# Patient Record
Sex: MALE | Employment: UNEMPLOYED | URBAN - METROPOLITAN AREA
[De-identification: names, ages, dates, MRNs, and addresses within clinical notes are randomized per-mention and may not be internally consistent; named-entity substitution may affect disease eponyms.]

---

## 2018-09-19 ENCOUNTER — HOSPITAL ENCOUNTER (EMERGENCY)
Age: 68
Discharge: ANOTHER HOSPITAL | End: 2018-09-19
Attending: EMERGENCY MEDICINE
Payer: MEDICARE

## 2018-09-19 ENCOUNTER — HOSPITAL ENCOUNTER (INPATIENT)
Age: 68
LOS: 6 days | Discharge: NURSING, REHAB OR LONG-TERM CARE | DRG: 179 | End: 2018-09-25
Attending: INTERNAL MEDICINE | Admitting: INTERNAL MEDICINE
Payer: MEDICARE

## 2018-09-19 ENCOUNTER — APPOINTMENT (OUTPATIENT)
Dept: CT IMAGING | Age: 68
End: 2018-09-19
Payer: MEDICARE

## 2018-09-19 ENCOUNTER — APPOINTMENT (OUTPATIENT)
Dept: GENERAL RADIOLOGY | Age: 68
End: 2018-09-19
Payer: MEDICARE

## 2018-09-19 VITALS
BODY MASS INDEX: 25.85 KG/M2 | HEART RATE: 83 BPM | WEIGHT: 170 LBS | TEMPERATURE: 98.5 F | RESPIRATION RATE: 17 BRPM | SYSTOLIC BLOOD PRESSURE: 116 MMHG | DIASTOLIC BLOOD PRESSURE: 62 MMHG | OXYGEN SATURATION: 93 %

## 2018-09-19 DIAGNOSIS — J18.9 PNEUMONIA DUE TO ORGANISM: Primary | ICD-10-CM

## 2018-09-19 DIAGNOSIS — D72.829 LEUKOCYTOSIS, UNSPECIFIED TYPE: ICD-10-CM

## 2018-09-19 DIAGNOSIS — R11.11 VOMITING WITHOUT NAUSEA, INTRACTABILITY OF VOMITING NOT SPECIFIED, UNSPECIFIED VOMITING TYPE: ICD-10-CM

## 2018-09-19 PROBLEM — R10.9 ABDOMINAL PAIN: Status: ACTIVE | Noted: 2018-09-19

## 2018-09-19 LAB
-: NORMAL
ABSOLUTE EOS #: 0 K/UL (ref 0–0.44)
ABSOLUTE IMMATURE GRANULOCYTE: 0.67 K/UL (ref 0–0.3)
ABSOLUTE LYMPH #: 1.69 K/UL (ref 1.1–3.7)
ABSOLUTE MONO #: 2.36 K/UL (ref 0.1–1.2)
ALBUMIN SERPL-MCNC: 3.2 G/DL (ref 3.5–5.2)
ALBUMIN/GLOBULIN RATIO: 0.8 (ref 1–2.5)
ALP BLD-CCNC: 233 U/L (ref 40–129)
ALT SERPL-CCNC: 66 U/L (ref 5–41)
ANION GAP SERPL CALCULATED.3IONS-SCNC: 17 MMOL/L (ref 9–17)
AST SERPL-CCNC: 62 U/L
BASOPHILS # BLD: 0 % (ref 0–2)
BASOPHILS ABSOLUTE: 0 K/UL (ref 0–0.2)
BILIRUB SERPL-MCNC: 0.94 MG/DL (ref 0.3–1.2)
BUN BLDV-MCNC: 9 MG/DL (ref 8–23)
BUN/CREAT BLD: ABNORMAL (ref 9–20)
CALCIUM SERPL-MCNC: 8.6 MG/DL (ref 8.6–10.4)
CHLORIDE BLD-SCNC: 90 MMOL/L (ref 98–107)
CO2: 24 MMOL/L (ref 20–31)
CREAT SERPL-MCNC: 0.6 MG/DL (ref 0.7–1.2)
D-DIMER QUANTITATIVE: 0.86 MG/L FEU
DIFFERENTIAL TYPE: ABNORMAL
EKG ATRIAL RATE: 103 BPM
EKG P AXIS: 67 DEGREES
EKG P-R INTERVAL: 146 MS
EKG Q-T INTERVAL: 336 MS
EKG QRS DURATION: 68 MS
EKG QTC CALCULATION (BAZETT): 440 MS
EKG R AXIS: 79 DEGREES
EKG T AXIS: 70 DEGREES
EKG VENTRICULAR RATE: 103 BPM
EOSINOPHILS RELATIVE PERCENT: 0 % (ref 1–4)
GFR AFRICAN AMERICAN: >60 ML/MIN
GFR NON-AFRICAN AMERICAN: >60 ML/MIN
GFR SERPL CREATININE-BSD FRML MDRD: ABNORMAL ML/MIN/{1.73_M2}
GFR SERPL CREATININE-BSD FRML MDRD: ABNORMAL ML/MIN/{1.73_M2}
GLUCOSE BLD-MCNC: 138 MG/DL (ref 70–99)
HCT VFR BLD CALC: 46.2 % (ref 40.7–50.3)
HEMOGLOBIN: 15.5 G/DL (ref 13–17)
IMMATURE GRANULOCYTES: 2 %
INR BLD: 1.1
LACTIC ACID, SEPSIS WHOLE BLOOD: 1.3 MMOL/L (ref 0.5–1.9)
LACTIC ACID, SEPSIS: NORMAL MMOL/L (ref 0.5–1.9)
LACTIC ACID, WHOLE BLOOD: 1.4 MMOL/L (ref 0.7–2.1)
LACTIC ACID, WHOLE BLOOD: 2.2 MMOL/L (ref 0.7–2.1)
LIPASE: 17 U/L (ref 13–60)
LYMPHOCYTES # BLD: 5 % (ref 24–43)
MAGNESIUM: 2.1 MG/DL (ref 1.6–2.6)
MCH RBC QN AUTO: 28.4 PG (ref 25.2–33.5)
MCHC RBC AUTO-ENTMCNC: 33.5 G/DL (ref 28.4–34.8)
MCV RBC AUTO: 84.8 FL (ref 82.6–102.9)
MONOCYTES # BLD: 7 % (ref 3–12)
MORPHOLOGY: ABNORMAL
NRBC AUTOMATED: 0 PER 100 WBC
PARTIAL THROMBOPLASTIN TIME: 26.6 SEC (ref 20.5–30.5)
PDW BLD-RTO: 13.7 % (ref 11.8–14.4)
PLATELET # BLD: 337 K/UL (ref 138–453)
PLATELET ESTIMATE: ABNORMAL
PMV BLD AUTO: 10.9 FL (ref 8.1–13.5)
POC TROPONIN I: 0.06 NG/ML (ref 0–0.1)
POC TROPONIN INTERP: NORMAL
POTASSIUM SERPL-SCNC: 3.6 MMOL/L (ref 3.7–5.3)
PROTHROMBIN TIME: 11.7 SEC (ref 9–12)
RBC # BLD: 5.45 M/UL (ref 4.21–5.77)
RBC # BLD: ABNORMAL 10*6/UL
REASON FOR REJECTION: NORMAL
SEG NEUTROPHILS: 86 % (ref 36–65)
SEGMENTED NEUTROPHILS ABSOLUTE COUNT: 28.98 K/UL (ref 1.5–8.1)
SODIUM BLD-SCNC: 131 MMOL/L (ref 135–144)
TOTAL PROTEIN: 7.4 G/DL (ref 6.4–8.3)
WBC # BLD: 33.7 K/UL (ref 3.5–11.3)
WBC # BLD: ABNORMAL 10*3/UL
ZZ NTE CLEAN UP: ORDERED TEST: NORMAL
ZZ NTE WITH NAME CLEAN UP: SPECIMEN SOURCE: NORMAL

## 2018-09-19 PROCEDURE — 6370000000 HC RX 637 (ALT 250 FOR IP): Performed by: STUDENT IN AN ORGANIZED HEALTH CARE EDUCATION/TRAINING PROGRAM

## 2018-09-19 PROCEDURE — 2580000003 HC RX 258: Performed by: STUDENT IN AN ORGANIZED HEALTH CARE EDUCATION/TRAINING PROGRAM

## 2018-09-19 PROCEDURE — 6360000004 HC RX CONTRAST MEDICATION: Performed by: EMERGENCY MEDICINE

## 2018-09-19 PROCEDURE — 87040 BLOOD CULTURE FOR BACTERIA: CPT

## 2018-09-19 PROCEDURE — 71046 X-RAY EXAM CHEST 2 VIEWS: CPT

## 2018-09-19 PROCEDURE — 83605 ASSAY OF LACTIC ACID: CPT

## 2018-09-19 PROCEDURE — 2060000000 HC ICU INTERMEDIATE R&B

## 2018-09-19 PROCEDURE — 99223 1ST HOSP IP/OBS HIGH 75: CPT | Performed by: INTERNAL MEDICINE

## 2018-09-19 PROCEDURE — 83735 ASSAY OF MAGNESIUM: CPT

## 2018-09-19 PROCEDURE — 6360000002 HC RX W HCPCS: Performed by: STUDENT IN AN ORGANIZED HEALTH CARE EDUCATION/TRAINING PROGRAM

## 2018-09-19 PROCEDURE — 99285 EMERGENCY DEPT VISIT HI MDM: CPT

## 2018-09-19 PROCEDURE — 6360000002 HC RX W HCPCS: Performed by: NURSE PRACTITIONER

## 2018-09-19 PROCEDURE — 83690 ASSAY OF LIPASE: CPT

## 2018-09-19 PROCEDURE — 94640 AIRWAY INHALATION TREATMENT: CPT

## 2018-09-19 PROCEDURE — 6370000000 HC RX 637 (ALT 250 FOR IP): Performed by: INTERNAL MEDICINE

## 2018-09-19 PROCEDURE — 6360000002 HC RX W HCPCS: Performed by: EMERGENCY MEDICINE

## 2018-09-19 PROCEDURE — 71260 CT THORAX DX C+: CPT

## 2018-09-19 PROCEDURE — 93005 ELECTROCARDIOGRAM TRACING: CPT

## 2018-09-19 PROCEDURE — 2580000003 HC RX 258: Performed by: NURSE PRACTITIONER

## 2018-09-19 PROCEDURE — 96375 TX/PRO/DX INJ NEW DRUG ADDON: CPT

## 2018-09-19 PROCEDURE — 85730 THROMBOPLASTIN TIME PARTIAL: CPT

## 2018-09-19 PROCEDURE — 96365 THER/PROPH/DIAG IV INF INIT: CPT

## 2018-09-19 PROCEDURE — 85379 FIBRIN DEGRADATION QUANT: CPT

## 2018-09-19 PROCEDURE — 85025 COMPLETE CBC W/AUTO DIFF WBC: CPT

## 2018-09-19 PROCEDURE — 80053 COMPREHEN METABOLIC PANEL: CPT

## 2018-09-19 PROCEDURE — 96367 TX/PROPH/DG ADDL SEQ IV INF: CPT

## 2018-09-19 PROCEDURE — 84484 ASSAY OF TROPONIN QUANT: CPT

## 2018-09-19 PROCEDURE — 85610 PROTHROMBIN TIME: CPT

## 2018-09-19 RX ORDER — PANTOPRAZOLE SODIUM 40 MG/1
40 TABLET, DELAYED RELEASE ORAL
Status: DISCONTINUED | OUTPATIENT
Start: 2018-09-20 | End: 2018-09-24 | Stop reason: SDUPTHER

## 2018-09-19 RX ORDER — 0.9 % SODIUM CHLORIDE 0.9 %
1000 INTRAVENOUS SOLUTION INTRAVENOUS ONCE
Status: DISCONTINUED | OUTPATIENT
Start: 2018-09-19 | End: 2018-09-19

## 2018-09-19 RX ORDER — ONDANSETRON 2 MG/ML
4 INJECTION INTRAMUSCULAR; INTRAVENOUS EVERY 6 HOURS PRN
Status: DISCONTINUED | OUTPATIENT
Start: 2018-09-19 | End: 2018-09-25 | Stop reason: HOSPADM

## 2018-09-19 RX ORDER — PROMETHAZINE HYDROCHLORIDE 25 MG/ML
12.5 INJECTION, SOLUTION INTRAMUSCULAR; INTRAVENOUS EVERY 4 HOURS PRN
Status: DISCONTINUED | OUTPATIENT
Start: 2018-09-19 | End: 2018-09-25 | Stop reason: HOSPADM

## 2018-09-19 RX ORDER — IPRATROPIUM BROMIDE AND ALBUTEROL SULFATE 2.5; .5 MG/3ML; MG/3ML
1 SOLUTION RESPIRATORY (INHALATION) ONCE
Status: COMPLETED | OUTPATIENT
Start: 2018-09-19 | End: 2018-09-19

## 2018-09-19 RX ORDER — IPRATROPIUM BROMIDE AND ALBUTEROL SULFATE 2.5; .5 MG/3ML; MG/3ML
1 SOLUTION RESPIRATORY (INHALATION) EVERY 6 HOURS PRN
Status: DISCONTINUED | OUTPATIENT
Start: 2018-09-19 | End: 2018-09-25 | Stop reason: HOSPADM

## 2018-09-19 RX ORDER — TAMSULOSIN HYDROCHLORIDE 0.4 MG/1
0.4 CAPSULE ORAL DAILY
Status: DISCONTINUED | OUTPATIENT
Start: 2018-09-19 | End: 2018-09-25 | Stop reason: HOSPADM

## 2018-09-19 RX ORDER — ONDANSETRON 4 MG/1
4 TABLET, ORALLY DISINTEGRATING ORAL ONCE
Status: COMPLETED | OUTPATIENT
Start: 2018-09-19 | End: 2018-09-19

## 2018-09-19 RX ORDER — BISACODYL 10 MG
10 SUPPOSITORY, RECTAL RECTAL DAILY PRN
Status: DISCONTINUED | OUTPATIENT
Start: 2018-09-19 | End: 2018-09-23 | Stop reason: SDUPTHER

## 2018-09-19 RX ORDER — DOCUSATE SODIUM 100 MG/1
100 CAPSULE, LIQUID FILLED ORAL 2 TIMES DAILY
Status: DISCONTINUED | OUTPATIENT
Start: 2018-09-19 | End: 2018-09-19

## 2018-09-19 RX ORDER — SODIUM CHLORIDE 0.9 % (FLUSH) 0.9 %
10 SYRINGE (ML) INJECTION EVERY 12 HOURS SCHEDULED
Status: DISCONTINUED | OUTPATIENT
Start: 2018-09-19 | End: 2018-09-25 | Stop reason: HOSPADM

## 2018-09-19 RX ORDER — FERROUS SULFATE 325(65) MG
325 TABLET ORAL 2 TIMES DAILY WITH MEALS
Status: DISCONTINUED | OUTPATIENT
Start: 2018-09-19 | End: 2018-09-25 | Stop reason: HOSPADM

## 2018-09-19 RX ORDER — SODIUM CHLORIDE 0.9 % (FLUSH) 0.9 %
10 SYRINGE (ML) INJECTION PRN
Status: DISCONTINUED | OUTPATIENT
Start: 2018-09-19 | End: 2018-09-25 | Stop reason: HOSPADM

## 2018-09-19 RX ORDER — SODIUM CHLORIDE 9 MG/ML
INJECTION, SOLUTION INTRAVENOUS CONTINUOUS
Status: DISCONTINUED | OUTPATIENT
Start: 2018-09-19 | End: 2018-09-25 | Stop reason: HOSPADM

## 2018-09-19 RX ORDER — LANOLIN ALCOHOL/MO/W.PET/CERES
325 CREAM (GRAM) TOPICAL 2 TIMES DAILY
Status: DISCONTINUED | OUTPATIENT
Start: 2018-09-19 | End: 2018-09-19 | Stop reason: CLARIF

## 2018-09-19 RX ORDER — NICOTINE 21 MG/24HR
1 PATCH, TRANSDERMAL 24 HOURS TRANSDERMAL DAILY PRN
Status: DISCONTINUED | OUTPATIENT
Start: 2018-09-19 | End: 2018-09-25 | Stop reason: HOSPADM

## 2018-09-19 RX ORDER — ALBUTEROL SULFATE 90 UG/1
2 AEROSOL, METERED RESPIRATORY (INHALATION) EVERY 6 HOURS PRN
Status: DISCONTINUED | OUTPATIENT
Start: 2018-09-19 | End: 2018-09-25 | Stop reason: HOSPADM

## 2018-09-19 RX ORDER — METHYLPREDNISOLONE SODIUM SUCCINATE 125 MG/2ML
125 INJECTION, POWDER, LYOPHILIZED, FOR SOLUTION INTRAMUSCULAR; INTRAVENOUS ONCE
Status: COMPLETED | OUTPATIENT
Start: 2018-09-19 | End: 2018-09-19

## 2018-09-19 RX ORDER — ACETAMINOPHEN 325 MG/1
650 TABLET ORAL EVERY 4 HOURS PRN
Status: DISCONTINUED | OUTPATIENT
Start: 2018-09-19 | End: 2018-09-25 | Stop reason: HOSPADM

## 2018-09-19 RX ORDER — 0.9 % SODIUM CHLORIDE 0.9 %
30 INTRAVENOUS SOLUTION INTRAVENOUS ONCE
Status: COMPLETED | OUTPATIENT
Start: 2018-09-19 | End: 2018-09-19

## 2018-09-19 RX ADMIN — METHYLPREDNISOLONE SODIUM SUCCINATE 125 MG: 125 INJECTION, POWDER, FOR SOLUTION INTRAMUSCULAR; INTRAVENOUS at 06:47

## 2018-09-19 RX ADMIN — PIPERACILLIN AND TAZOBACTAM 3.38 G: 3; .375 INJECTION, POWDER, LYOPHILIZED, FOR SOLUTION INTRAVENOUS; PARENTERAL at 08:42

## 2018-09-19 RX ADMIN — ONDANSETRON 4 MG: 4 TABLET, ORALLY DISINTEGRATING ORAL at 06:47

## 2018-09-19 RX ADMIN — TAMSULOSIN HYDROCHLORIDE 0.4 MG: 0.4 CAPSULE ORAL at 21:06

## 2018-09-19 RX ADMIN — IOPAMIDOL 75 ML: 755 INJECTION, SOLUTION INTRAVENOUS at 08:32

## 2018-09-19 RX ADMIN — SODIUM CHLORIDE: 9 INJECTION, SOLUTION INTRAVENOUS at 23:29

## 2018-09-19 RX ADMIN — SODIUM CHLORIDE 2313 ML: 9 INJECTION, SOLUTION INTRAVENOUS at 08:43

## 2018-09-19 RX ADMIN — Medication 10 ML: at 21:07

## 2018-09-19 RX ADMIN — PIPERACILLIN SODIUM AND TAZOBACTAM SODIUM 3.38 G: 3; .375 INJECTION, POWDER, LYOPHILIZED, FOR SOLUTION INTRAVENOUS at 23:29

## 2018-09-19 RX ADMIN — IPRATROPIUM BROMIDE AND ALBUTEROL SULFATE 1 AMPULE: .5; 3 SOLUTION RESPIRATORY (INHALATION) at 06:17

## 2018-09-19 RX ADMIN — ENOXAPARIN SODIUM 40 MG: 40 INJECTION SUBCUTANEOUS at 21:06

## 2018-09-19 RX ADMIN — DOCUSATE SODIUM 100 MG: 100 CAPSULE, LIQUID FILLED ORAL at 21:05

## 2018-09-19 RX ADMIN — Medication 1250 MG: at 10:00

## 2018-09-19 RX ADMIN — ONDANSETRON 4 MG: 2 INJECTION INTRAMUSCULAR; INTRAVENOUS at 21:05

## 2018-09-19 RX ADMIN — FERROUS SULFATE TAB 325 MG (65 MG ELEMENTAL FE) 325 MG: 325 (65 FE) TAB at 21:05

## 2018-09-19 ASSESSMENT — PAIN SCALES - GENERAL
PAINLEVEL_OUTOF10: 0
PAINLEVEL_OUTOF10: 4

## 2018-09-19 ASSESSMENT — PAIN DESCRIPTION - ONSET: ONSET: SUDDEN

## 2018-09-19 ASSESSMENT — PAIN DESCRIPTION - DESCRIPTORS: DESCRIPTORS: ACHING

## 2018-09-19 ASSESSMENT — PAIN DESCRIPTION - FREQUENCY: FREQUENCY: CONTINUOUS

## 2018-09-19 ASSESSMENT — PAIN SCALES - WONG BAKER: WONGBAKER_NUMERICALRESPONSE: 0

## 2018-09-19 ASSESSMENT — PAIN DESCRIPTION - ORIENTATION: ORIENTATION: LEFT;LOWER;RIGHT

## 2018-09-19 ASSESSMENT — PAIN DESCRIPTION - LOCATION: LOCATION: ABDOMEN

## 2018-09-19 NOTE — ED PROVIDER NOTES
[facility] ED  Emergency Department Encounter  Emergency Medicine Resident     Pt Name: Ally Bridges  MRN: [id number]  [place] 1950  Date of evaluation: 18  PCP:  Denise Rangel MD    [address] [place] [address]       Chief Complaint   Patient presents with    Emesis       HISTORY OF PRESENT ILLNESS  (Location/Symptom, Timing/Onset, Context/Setting, Quality, Duration, Modifying Factors, Severity.)      Ally Bridges is a 79 y.o. male who presents with Abdominal pain, nausea, vomiting. Patient states that last week he ate some yang bits that may have gone bad, . After that, he started developing nausea, vomiting, diarrhea. States that he has also been having productive cough, yellow sputum. Intermittent headache. Denies chest pain, sugars of breath, fever, dysuria, hematuria, blood in his stools, Leg swelling, leg tenderness, prolonged immobilization. PAST MEDICAL / SURGICAL / SOCIAL / FAMILY HISTORY      has a past medical history of Acid reflux; Anemia; Colon polyps; COPD (chronic obstructive pulmonary disease) ([facility] [address] and Rectal prolapse.       has a past surgical history that includes Tonsillectomy; eye surgery; Colonoscopy (2015); and Colonoscopy (2016). Social History     Social History    Marital status: Single     Spouse name: N/A    Number of children: N/A    Years of education: N/A     Occupational History    Not on file. Social History Main Topics    Smoking status: Current Every Day Smoker     Packs/day: 1.50    Smokeless tobacco: Not on file    Alcohol use No    Drug use: Yes     Types: Marijuana    Sexual activity: Not on file     Other Topics Concern    Not on file     Social History Narrative    No narrative on file       Family History   Problem Relation Age of Onset    Diabetes Brother          Allergies:  Patient has no known allergies.     Home Medications:  Prior to Admission medications    Medication Sig Start Date End Date Taking? Authorizing Provider   omeprazole (PRILOSEC) 20 MG capsule Take 20 mg by mouth 2 times daily    Historical Provider, MD   albuterol-ipratropium (COMBIVENT RESPIMAT)  MCG/ACT AERS inhaler Inhale 1 puff into the lungs every 6 hours as needed for Wheezing    Historical Provider, MD   albuterol sulfate  (90 BASE) MCG/ACT inhaler Inhale 2 puffs into the lungs every 6 hours as needed for Wheezing    Historical Provider, MD   hydrocortisone (ANUSOL-HC) 2.5 % rectal cream Place rectally 3 times daily as needed for Hemorrhoids Place rectally 2 times daily. Historical Provider, MD   docusate sodium (COLACE) 100 MG capsule Take 1 capsule by mouth 2 times daily 8/21/15   Naida Salvage,    acetaminophen 650 MG TABS Take 650 mg by mouth every 4 hours as needed 8/15/15   Lita Chandler MD   tamsulosin [facility]) 0.4 MG capsule Take 1 capsule by mouth daily 8/15/15   Lita Chandler MD   ferrous sulfate (FE TABS) 325 (65 FE) MG EC tablet Take 1 tablet by mouth 2 times daily 8/15/15   Lita Chandler MD       REVIEW OF SYSTEMS    (2-9 systems for level 4, 10 or more for level 5)      Review of Systems   Constitutional: Positive for chills. Negative for activity change, fatigue and fever. HENT: Negative for congestion, ear pain, rhinorrhea and sinus pain. Eyes: Negative for pain and redness. Respiratory: Positive for cough. Negative for apnea, shortness of breath and wheezing. Cardiovascular: Negative for chest pain. Gastrointestinal: Positive for abdominal pain, diarrhea, nausea and vomiting. Negative for blood in stool. Genitourinary: Negative for decreased urine volume, difficulty urinating and dysuria. Musculoskeletal: Negative for neck pain and neck stiffness. Skin: Negative for rash. Neurological: Positive for headaches. Negative for dizziness, syncope and weakness.        PHYSICAL EXAM   (up to 7 for level 4, 8 or more for level 5)      INITIAL VITALS:   /75   Pulse 115 Value Ref Range    POC Troponin I 0.06 0.00 - 0.10 ng/mL    POC Troponin Interp       The Troponin-I (POC) results cannot be compared to the Troponin-T results. EKG 12 Lead   Result Value Ref Range    Ventricular Rate 103 BPM    Atrial Rate 103 BPM    P-R Interval 146 ms    QRS Duration 68 ms    Q-T Interval 336 ms    QTc Calculation (Bazett) 440 ms    P Axis 67 degrees    R Axis 79 degrees    T Axis 70 degrees       IMPRESSION:  82-year-old male with COPD who presents to the wrist with three-day history of cough, one-week history of nausea, vomiting, diarrhea. Cardiac RRR, no murmurs, rubs, gallops, Lungs are CTA-B, no wheezes, rales, rhonchi, Abd soft, mildly diffusely tender, nondistended. Looks ill, nontoxic appearing. Plan is for basic labs, d-dimer, troponin, EKG. RADIOLOGY:  CT CHEST PULMONARY EMBOLISM W CONTRAST   Final Result   Motion artifact degrades this examination, however there is no evidence for   acute pulmonary embolism. Bilateral airspace disease, suggestive of an inflammatory process or   infection. Mild central airway congestion is also noted. Given the   background of emphysema, short-term noncontrast CT follow-up in 3 months is   recommended to ensure resolution. Mildly prominent mediastinal and hilar lymph nodes. These may be reactive   given the airspace disease. Attention to on follow-up is recommended. Mild relatively diffuse esophageal wall thickening. Question esophagitis. Moderate size hiatal hernia. Emphysema. XR CHEST STANDARD (2 VW)   Final Result   1. Similar prominence of the interstitial/pulmonary vascular markings can be   seen in setting of chronic interstitial disease, mild vascular congestion   and/or atypical pneumonitis. No focal airspace consolidation. 2. Emphysematous changes also identified.              EKG  Sinus tachycardia 103, normal axis, no ST segment or T-wave changes     All EKG's are interpreted by the Emergency

## 2018-09-19 NOTE — ED PROVIDER NOTES
Care assumed from Dr. Chase Notch    Elevated d dimer and white count. Pending CT chest for PE. Pt has pneumonia. Pt has elevated white count and CT shows inflammatory changes. Pt is satting 93-95 on O2. Pt coughing up purulent sputum. Pt amenable to transfer to [facility] since we are currently boarding.       Landon Del Cid MD  09/19/18 2026

## 2018-09-19 NOTE — ED PROVIDER NOTES
[address] ED  Emergency Department  Emergency Medicine Resident Sign-out     Care of Ibeth Lamb was assumed from Dr. Cheyenne Chand and is being seen for Emesis  . The patient's initial evaluation and plan have been discussed with the prior provider who initially evaluated the patient.      EMERGENCY DEPARTMENT COURSE / MEDICAL DECISION MAKING:       MEDICATIONS GIVEN:  Orders Placed This Encounter   Medications    ipratropium-albuterol (DUONEB) nebulizer solution 1 ampule    ondansetron (ZOFRAN-ODT) disintegrating tablet 4 mg    methylPREDNISolone sodium (SOLU-MEDROL) injection 125 mg    DISCONTD: 0.9 % sodium chloride bolus    0.9 % sodium chloride bolus    vancomycin (VANCOCIN) 1250 mg in dextrose 5 % 250 mL IVPB    piperacillin-tazobactam (ZOSYN) 3.375 g in dextrose 5 % 50 mL IVPB (mini-bag)    iopamidol (ISOVUE-370) 76 % injection 75 mL       LABS / RADIOLOGY:     Labs Reviewed   CBC WITH AUTO DIFFERENTIAL - Abnormal; Notable for the following:        Result Value    WBC 33.7 (*)     Immature Granulocytes 2 (*)     Seg Neutrophils 86 (*)     Lymphocytes 5 (*)     Eosinophils % 0 (*)     Absolute Immature Granulocyte 0.67 (*)     Segs Absolute 28.98 (*)     Absolute Mono # 2.36 (*)     All other components within normal limits   COMPREHENSIVE METABOLIC PANEL - Abnormal; Notable for the following:     Glucose 138 (*)     CREATININE 0.60 (*)     Sodium 131 (*)     Potassium 3.6 (*)     Chloride 90 (*)     Alkaline Phosphatase 233 (*)     ALT 66 (*)     AST 62 (*)     Alb 3.2 (*)     Albumin/Globulin Ratio 0.8 (*)     All other components within normal limits   LACTIC ACID, WHOLE BLOOD - Abnormal; Notable for the following:     Lactic Acid, Whole Blood 2.2 (*)     All other components within normal limits   CULTURE BLOOD #1   CULTURE BLOOD #1   URINE CULTURE   D-DIMER, QUANTITATIVE   LACTIC ACID, WHOLE BLOOD   PROTIME-INR   APTT   MAGNESIUM   LACTATE, SEPSIS   LIPASE   SPECIMEN REJECTION URINALYSIS WITH MICROSCOPIC   POCT TROPONIN   POCT TROPONIN   POCT TROPONIN       Xr Chest Standard (2 Vw)    Result Date: 9/19/2018  EXAMINATION: TWO VIEWS OF THE CHEST 9/19/2018 6:37 am COMPARISON: Chest x-ray from 08/16/2015. HISTORY: ORDERING SYSTEM PROVIDED HISTORY: Cough, SOB TECHNOLOGIST PROVIDED HISTORY: Cough, SOB FINDINGS: Lungs are hyperexpanded with flattening of the hemidiaphragms. There is no focal airspace consolidation, pleural effusion or pneumothorax. The cardiomediastinal silhouette appears within normal limits. There is similar prominence of the pulmonary vascular/interstitial markings. Visualized osseous structures appear intact, and grossly unremarkable, given the non dedicated imaging. 1. Similar prominence of the interstitial/pulmonary vascular markings can be seen in setting of chronic interstitial disease, mild vascular congestion and/or atypical pneumonitis. No focal airspace consolidation. 2. Emphysematous changes also identified. Ct Chest Pulmonary Embolism W Contrast    Result Date: 9/19/2018  EXAMINATION: CTA OF THE CHEST 9/19/2018 8:40 am TECHNIQUE: CTA of the chest was performed after the administration of intravenous contrast.  Multiplanar reformatted images are provided for review. MIP images are provided for review. Dose modulation, iterative reconstruction, and/or weight based adjustment of the mA/kV was utilized to reduce the radiation dose to as low as reasonably achievable. COMPARISON: None. HISTORY: ORDERING SYSTEM PROVIDED HISTORY: pulmonary embolism FINDINGS: Pulmonary Arteries: Pulmonary arteries are adequately opacified for evaluation. Motion artifact degrades evaluation of the segmental branches in the lower lobes. No evidence of intraluminal filling defect to suggest pulmonary embolism. Main pulmonary artery is normal in caliber.  Mediastinum: Mildly prominent right paraesophageal lymphadenopathy is noted in the superior mediastinum measuring up to 8 mm in short axis on image 104. Right hilar lymphatic tissue is also prominent measuring 12 mm on image 63. Mild nonspecific esophageal wall thickening is noted and appears relatively diffuse. Moderate size hiatal hernia is noted. The heart and pericardium demonstrate no acute abnormality. There is no acute abnormality of the thoracic aorta. Calcified atheromatous plaque and coronary calcifications are noted. Lungs/pleura: Respiratory motion artifact degrades fine parenchymal detail, notably in the lung bases. Moderately severe emphysema is noted. Opacities in the medial right upper lobe and medial lingula are noted as well as a peripheral opacity in the inferior right middle lobe. Findings of subsegmental atelectasis in the lung bases are noted. No effusion. No pneumothorax. The airway appears patent. Mild peribronchial wall thickening is noted. Upper Abdomen: No acute findings. Soft Tissues/Bones: No acute bone or soft tissue abnormality. Motion artifact degrades this examination, however there is no evidence for acute pulmonary embolism. Bilateral airspace disease, suggestive of an inflammatory process or infection. Mild central airway congestion is also noted. Given the background of emphysema, short-term noncontrast CT follow-up in 3 months is recommended to ensure resolution. Mildly prominent mediastinal and hilar lymph nodes. These may be reactive given the airspace disease. Attention to on follow-up is recommended. Mild relatively diffuse esophageal wall thickening. Question esophagitis. Moderate size hiatal hernia. Emphysema. RECENT VITALS:     Temp: 98.5 °F (36.9 °C),  Pulse: 83, Resp: 17, BP: 116/62, SpO2: 93 %    This patient is a 79 y.o. Male with midepigastric abdominal pain with associated nausea and vomiting and mild hypoxia as evidenced by low oxygen saturation low 90s.   Patient worked up for infectious etiology versus PE.  CTA of the chest was obtained that showed no

## 2018-09-19 NOTE — ED PROVIDER NOTES
axis no st elevation,       CRITICAL CARE: There was a high probability of clinically significant/life threatening deterioration in this patient's condition which required my urgent intervention. Total critical care time was  0   minutes. This excludes any time for separately reportable procedures.        [address], DO  09/19/18 6106       [facility], DO  09/19/18 [address], DO  09/19/18 [address], DO  09/20/18 7521

## 2018-09-19 NOTE — ED NOTES
Pt c/o headache. Pt coughing up sputum into emesis bag. Pt updated on CT scan. Pt states his breathing is better. Continue to monitor pt.       Chelsea Reddy RN  09/19/18 1409

## 2018-09-20 ENCOUNTER — APPOINTMENT (OUTPATIENT)
Dept: GENERAL RADIOLOGY | Age: 68
DRG: 179 | End: 2018-09-20
Attending: INTERNAL MEDICINE
Payer: MEDICARE

## 2018-09-20 PROBLEM — J18.9 PNEUMONIA: Status: ACTIVE | Noted: 2018-09-20

## 2018-09-20 LAB
ABSOLUTE EOS #: 0 K/UL (ref 0–0.4)
ABSOLUTE IMMATURE GRANULOCYTE: ABNORMAL K/UL (ref 0–0.3)
ABSOLUTE LYMPH #: 1.82 K/UL (ref 1–4.8)
ABSOLUTE MONO #: 0.91 K/UL (ref 0.1–1.3)
ANION GAP SERPL CALCULATED.3IONS-SCNC: 10 MMOL/L (ref 9–17)
BASOPHILS # BLD: 0 % (ref 0–2)
BASOPHILS ABSOLUTE: 0 K/UL (ref 0–0.2)
BUN BLDV-MCNC: 12 MG/DL (ref 8–23)
BUN/CREAT BLD: ABNORMAL (ref 9–20)
CALCIUM SERPL-MCNC: 8.2 MG/DL (ref 8.6–10.4)
CHLORIDE BLD-SCNC: 99 MMOL/L (ref 98–107)
CO2: 27 MMOL/L (ref 20–31)
CREAT SERPL-MCNC: 0.56 MG/DL (ref 0.7–1.2)
DIFFERENTIAL TYPE: ABNORMAL
EOSINOPHILS RELATIVE PERCENT: 0 % (ref 0–4)
GFR AFRICAN AMERICAN: >60 ML/MIN
GFR NON-AFRICAN AMERICAN: >60 ML/MIN
GFR SERPL CREATININE-BSD FRML MDRD: ABNORMAL ML/MIN/{1.73_M2}
GFR SERPL CREATININE-BSD FRML MDRD: ABNORMAL ML/MIN/{1.73_M2}
GLUCOSE BLD-MCNC: 150 MG/DL (ref 70–99)
HCT VFR BLD CALC: 38.3 % (ref 41–53)
HEMOGLOBIN: 12.7 G/DL (ref 13.5–17.5)
IMMATURE GRANULOCYTES: ABNORMAL %
LYMPHOCYTES # BLD: 8 % (ref 24–44)
MCH RBC QN AUTO: 28.3 PG (ref 26–34)
MCHC RBC AUTO-ENTMCNC: 33.1 G/DL (ref 31–37)
MCV RBC AUTO: 85.6 FL (ref 80–100)
MONOCYTES # BLD: 4 % (ref 1–7)
MORPHOLOGY: NORMAL
MRSA, DNA, NASAL: NORMAL
NRBC AUTOMATED: ABNORMAL PER 100 WBC
PDW BLD-RTO: 14.1 % (ref 11.5–14.9)
PLATELET # BLD: 289 K/UL (ref 150–450)
PLATELET ESTIMATE: ABNORMAL
PMV BLD AUTO: 8.4 FL (ref 6–12)
POTASSIUM SERPL-SCNC: 4.2 MMOL/L (ref 3.7–5.3)
RBC # BLD: 4.47 M/UL (ref 4.5–5.9)
RBC # BLD: ABNORMAL 10*6/UL
SEG NEUTROPHILS: 88 % (ref 36–66)
SEGMENTED NEUTROPHILS ABSOLUTE COUNT: 19.97 K/UL (ref 1.3–9.1)
SODIUM BLD-SCNC: 136 MMOL/L (ref 135–144)
SPECIMEN DESCRIPTION: NORMAL
WBC # BLD: 22.7 K/UL (ref 3.5–11)
WBC # BLD: ABNORMAL 10*3/UL

## 2018-09-20 PROCEDURE — 2580000003 HC RX 258: Performed by: NURSE PRACTITIONER

## 2018-09-20 PROCEDURE — 6360000002 HC RX W HCPCS: Performed by: INTERNAL MEDICINE

## 2018-09-20 PROCEDURE — 87070 CULTURE OTHR SPECIMN AEROBIC: CPT

## 2018-09-20 PROCEDURE — 6360000002 HC RX W HCPCS: Performed by: NURSE PRACTITIONER

## 2018-09-20 PROCEDURE — G8979 MOBILITY GOAL STATUS: HCPCS

## 2018-09-20 PROCEDURE — G0008 ADMIN INFLUENZA VIRUS VAC: HCPCS | Performed by: INTERNAL MEDICINE

## 2018-09-20 PROCEDURE — 2580000003 HC RX 258: Performed by: STUDENT IN AN ORGANIZED HEALTH CARE EDUCATION/TRAINING PROGRAM

## 2018-09-20 PROCEDURE — 80048 BASIC METABOLIC PNL TOTAL CA: CPT

## 2018-09-20 PROCEDURE — 36415 COLL VENOUS BLD VENIPUNCTURE: CPT

## 2018-09-20 PROCEDURE — G0009 ADMIN PNEUMOCOCCAL VACCINE: HCPCS | Performed by: INTERNAL MEDICINE

## 2018-09-20 PROCEDURE — 90670 PCV13 VACCINE IM: CPT | Performed by: INTERNAL MEDICINE

## 2018-09-20 PROCEDURE — 6370000000 HC RX 637 (ALT 250 FOR IP): Performed by: NURSE PRACTITIONER

## 2018-09-20 PROCEDURE — 97161 PT EVAL LOW COMPLEX 20 MIN: CPT

## 2018-09-20 PROCEDURE — 97116 GAIT TRAINING THERAPY: CPT

## 2018-09-20 PROCEDURE — 6370000000 HC RX 637 (ALT 250 FOR IP): Performed by: STUDENT IN AN ORGANIZED HEALTH CARE EDUCATION/TRAINING PROGRAM

## 2018-09-20 PROCEDURE — 71045 X-RAY EXAM CHEST 1 VIEW: CPT

## 2018-09-20 PROCEDURE — 87205 SMEAR GRAM STAIN: CPT

## 2018-09-20 PROCEDURE — 87641 MR-STAPH DNA AMP PROBE: CPT

## 2018-09-20 PROCEDURE — 97165 OT EVAL LOW COMPLEX 30 MIN: CPT

## 2018-09-20 PROCEDURE — 94760 N-INVAS EAR/PLS OXIMETRY 1: CPT

## 2018-09-20 PROCEDURE — 90686 IIV4 VACC NO PRSV 0.5 ML IM: CPT | Performed by: INTERNAL MEDICINE

## 2018-09-20 PROCEDURE — 6370000000 HC RX 637 (ALT 250 FOR IP): Performed by: INTERNAL MEDICINE

## 2018-09-20 PROCEDURE — 2060000000 HC ICU INTERMEDIATE R&B

## 2018-09-20 PROCEDURE — 74018 RADEX ABDOMEN 1 VIEW: CPT

## 2018-09-20 PROCEDURE — 85025 COMPLETE CBC W/AUTO DIFF WBC: CPT

## 2018-09-20 PROCEDURE — 94640 AIRWAY INHALATION TREATMENT: CPT

## 2018-09-20 PROCEDURE — 94761 N-INVAS EAR/PLS OXIMETRY MLT: CPT

## 2018-09-20 PROCEDURE — 97530 THERAPEUTIC ACTIVITIES: CPT

## 2018-09-20 PROCEDURE — 6360000002 HC RX W HCPCS: Performed by: STUDENT IN AN ORGANIZED HEALTH CARE EDUCATION/TRAINING PROGRAM

## 2018-09-20 PROCEDURE — G8978 MOBILITY CURRENT STATUS: HCPCS

## 2018-09-20 RX ORDER — POTASSIUM CHLORIDE 20MEQ/15ML
40 LIQUID (ML) ORAL PRN
Status: DISCONTINUED | OUTPATIENT
Start: 2018-09-20 | End: 2018-09-25 | Stop reason: HOSPADM

## 2018-09-20 RX ORDER — GUAIFENESIN 600 MG/1
600 TABLET, EXTENDED RELEASE ORAL 2 TIMES DAILY
Status: DISCONTINUED | OUTPATIENT
Start: 2018-09-20 | End: 2018-09-25 | Stop reason: HOSPADM

## 2018-09-20 RX ORDER — IPRATROPIUM BROMIDE AND ALBUTEROL SULFATE 2.5; .5 MG/3ML; MG/3ML
1 SOLUTION RESPIRATORY (INHALATION)
Status: DISCONTINUED | OUTPATIENT
Start: 2018-09-20 | End: 2018-09-25 | Stop reason: HOSPADM

## 2018-09-20 RX ORDER — POTASSIUM CHLORIDE 7.45 MG/ML
10 INJECTION INTRAVENOUS PRN
Status: DISCONTINUED | OUTPATIENT
Start: 2018-09-20 | End: 2018-09-25 | Stop reason: HOSPADM

## 2018-09-20 RX ORDER — LEVOFLOXACIN 5 MG/ML
500 INJECTION, SOLUTION INTRAVENOUS EVERY 24 HOURS
Status: DISCONTINUED | OUTPATIENT
Start: 2018-09-20 | End: 2018-09-25 | Stop reason: HOSPADM

## 2018-09-20 RX ORDER — GUAIFENESIN 600 MG/1
600 TABLET, EXTENDED RELEASE ORAL 2 TIMES DAILY
Status: DISCONTINUED | OUTPATIENT
Start: 2018-09-20 | End: 2018-09-20 | Stop reason: SDUPTHER

## 2018-09-20 RX ORDER — BENZONATATE 100 MG/1
200 CAPSULE ORAL 3 TIMES DAILY PRN
Status: DISCONTINUED | OUTPATIENT
Start: 2018-09-20 | End: 2018-09-25 | Stop reason: HOSPADM

## 2018-09-20 RX ORDER — POTASSIUM CHLORIDE 20 MEQ/1
40 TABLET, EXTENDED RELEASE ORAL PRN
Status: DISCONTINUED | OUTPATIENT
Start: 2018-09-20 | End: 2018-09-25 | Stop reason: HOSPADM

## 2018-09-20 RX ADMIN — GUAIFENESIN 600 MG: 600 TABLET, EXTENDED RELEASE ORAL at 19:34

## 2018-09-20 RX ADMIN — GUAIFENESIN 600 MG: 600 TABLET, EXTENDED RELEASE ORAL at 08:28

## 2018-09-20 RX ADMIN — ONDANSETRON 4 MG: 2 INJECTION INTRAMUSCULAR; INTRAVENOUS at 05:33

## 2018-09-20 RX ADMIN — Medication 10 ML: at 19:35

## 2018-09-20 RX ADMIN — PNEUMOCOCCAL 13-VALENT CONJUGATE VACCINE 0.5 ML: 2.2; 2.2; 2.2; 2.2; 2.2; 4.4; 2.2; 2.2; 2.2; 2.2; 2.2; 2.2; 2.2 INJECTION, SUSPENSION INTRAMUSCULAR at 14:27

## 2018-09-20 RX ADMIN — INFLUENZA A VIRUS A/MICHIGAN/45/2015 X-275 (H1N1) ANTIGEN (FORMALDEHYDE INACTIVATED), INFLUENZA A VIRUS A/SINGAPORE/INFIMH-16-0019/2016 IVR-186 (H3N2) ANTIGEN (FORMALDEHYDE INACTIVATED), INFLUENZA B VIRUS B/PHUKET/3073/2013 ANTIGEN (FORMALDEHYDE INACTIVATED), AND INFLUENZA B VIRUS B/MARYLAND/15/2016 BX-69A ANTIGEN (FORMALDEHYDE INACTIVATED) 0.5 ML: 15; 15; 15; 15 INJECTION, SUSPENSION INTRAMUSCULAR at 14:23

## 2018-09-20 RX ADMIN — PIPERACILLIN SODIUM AND TAZOBACTAM SODIUM 3.38 G: 3; .375 INJECTION, POWDER, LYOPHILIZED, FOR SOLUTION INTRAVENOUS at 19:34

## 2018-09-20 RX ADMIN — PANTOPRAZOLE SODIUM 40 MG: 40 TABLET, DELAYED RELEASE ORAL at 05:31

## 2018-09-20 RX ADMIN — BENZONATATE 200 MG: 100 CAPSULE ORAL at 04:06

## 2018-09-20 RX ADMIN — VANCOMYCIN HYDROCHLORIDE 1000 MG: 1 INJECTION, POWDER, LYOPHILIZED, FOR SOLUTION INTRAVENOUS at 16:11

## 2018-09-20 RX ADMIN — LEVOFLOXACIN 500 MG: 5 INJECTION, SOLUTION INTRAVENOUS at 14:21

## 2018-09-20 RX ADMIN — FERROUS SULFATE TAB 325 MG (65 MG ELEMENTAL FE) 325 MG: 325 (65 FE) TAB at 17:46

## 2018-09-20 RX ADMIN — VANCOMYCIN HYDROCHLORIDE 1000 MG: 1 INJECTION, POWDER, LYOPHILIZED, FOR SOLUTION INTRAVENOUS at 04:05

## 2018-09-20 RX ADMIN — IPRATROPIUM BROMIDE AND ALBUTEROL SULFATE 1 AMPULE: .5; 3 SOLUTION RESPIRATORY (INHALATION) at 11:28

## 2018-09-20 RX ADMIN — IPRATROPIUM BROMIDE AND ALBUTEROL SULFATE 1 AMPULE: .5; 3 SOLUTION RESPIRATORY (INHALATION) at 16:36

## 2018-09-20 RX ADMIN — PIPERACILLIN SODIUM AND TAZOBACTAM SODIUM 3.38 G: 3; .375 INJECTION, POWDER, LYOPHILIZED, FOR SOLUTION INTRAVENOUS at 08:29

## 2018-09-20 RX ADMIN — ENOXAPARIN SODIUM 40 MG: 40 INJECTION SUBCUTANEOUS at 08:28

## 2018-09-20 RX ADMIN — ONDANSETRON 4 MG: 2 INJECTION INTRAMUSCULAR; INTRAVENOUS at 11:07

## 2018-09-20 RX ADMIN — SODIUM CHLORIDE: 9 INJECTION, SOLUTION INTRAVENOUS at 21:13

## 2018-09-20 RX ADMIN — TAMSULOSIN HYDROCHLORIDE 0.4 MG: 0.4 CAPSULE ORAL at 08:28

## 2018-09-20 RX ADMIN — FERROUS SULFATE TAB 325 MG (65 MG ELEMENTAL FE) 325 MG: 325 (65 FE) TAB at 08:28

## 2018-09-20 RX ADMIN — IPRATROPIUM BROMIDE AND ALBUTEROL SULFATE 1 AMPULE: .5; 3 SOLUTION RESPIRATORY (INHALATION) at 20:52

## 2018-09-20 ASSESSMENT — ENCOUNTER SYMPTOMS
SINUS PAIN: 0
WHEEZING: 0
WHEEZING: 0
ABDOMINAL PAIN: 1
ORTHOPNEA: 0
BLOOD IN STOOL: 0
COUGH: 1
NAUSEA: 1
COUGH: 1
EYE PAIN: 0
VOMITING: 1
DOUBLE VISION: 0
BACK PAIN: 0
RHINORRHEA: 0
VOMITING: 1
APNEA: 0
SHORTNESS OF BREATH: 0
DIARRHEA: 1
ABDOMINAL PAIN: 1
SORE THROAT: 0
BLURRED VISION: 0
NAUSEA: 1
SPUTUM PRODUCTION: 1
DIARRHEA: 1
SHORTNESS OF BREATH: 1
CONSTIPATION: 0
EYE REDNESS: 0

## 2018-09-20 ASSESSMENT — PAIN DESCRIPTION - PAIN TYPE: TYPE: ACUTE PAIN

## 2018-09-20 ASSESSMENT — PAIN SCALES - GENERAL
PAINLEVEL_OUTOF10: 6
PAINLEVEL_OUTOF10: 6
PAINLEVEL_OUTOF10: 1

## 2018-09-20 ASSESSMENT — PAIN DESCRIPTION - LOCATION
LOCATION: ABDOMEN
LOCATION: ABDOMEN

## 2018-09-20 ASSESSMENT — PAIN SCALES - WONG BAKER: WONGBAKER_NUMERICALRESPONSE: 0

## 2018-09-20 ASSESSMENT — PAIN DESCRIPTION - DESCRIPTORS: DESCRIPTORS: ACHING

## 2018-09-20 ASSESSMENT — PAIN DESCRIPTION - ORIENTATION
ORIENTATION: LOWER
ORIENTATION: LOWER

## 2018-09-20 NOTE — PROGRESS NOTES
Curtain  Bathroom Toilet: Standard  Bathroom Equipment: Grab bars around toilet, Grab bars in shower, Shower chair (Shower chair is in storage)  Home Equipment: [address]: Independent  Homemaking Responsibilities: Yes  Ambulation Assistance: Independent (with cane)  Transfer Assistance: Independent  Mode of Transportation: Bus  Additional Comments: Pt reports that he is homeless and has been living in a tent for 4 weeks in his brother's backyard. Pt has an air mattress with raised base so he can transfer in/out of bed. Objective          AROM RLE (degrees)  RLE AROM: WFL  AROM LLE (degrees)  LLE AROM : WFL  AROM RUE (degrees)  RUE General AROM: See OT eval   PROM LUE (degrees)  LUE General PROM: See OT eval  Strength RLE  Comment: 3+/5  Strength LLE  Comment: 3+/5  Strength Other  Other: Pt reports he feels are very weak for last 1 to 2 week. Sensation  Overall Sensation Status: WFL  Bed mobility  Rolling to Left: Stand by assistance  Rolling to Right: Stand by assistance  Supine to Sit: Stand by assistance  Sit to Supine: Stand by assistance  Transfers  Sit to Stand: Contact guard assistance  Stand to sit: Contact guard assistance  Bed to Chair: Contact guard assistance  Ambulation  Ambulation?: Yes  Ambulation 1  Surface: level tile  Device: Single point cane  Other Apparatus: O2 (3  lt o2)  Assistance: Contact guard assistance  Quality of Gait: Pt with decreased tolerance to activity, sao2 drops to 88%, HR in 90's with short distance walking. Pt not safe to transfer/ambulate on his own yet due to weakness  Distance: 30 ft  Comments: Sao2 up > 90% with seated rest.     Balance  Posture: Fair  Sitting - Static: Good  Sitting - Dynamic: Good;-  Standing - Static: Good;- (St cane)  Standing - Dynamic: Fair (St cane)        Assessment   Body structures, Functions, Activity limitations: Decreased functional mobility ; Decreased endurance;Decreased balance;Decreased strength  Assessment: Pt present with generalized weakness and decreased tolerance toacivity, will benefit from therapies at SNF. Treatment Diagnosis: weakness, difficulty walking. Specific instructions for Next Treatment: Monitor sao2 with activity  Prognosis: Good  Decision Making: Low Complexity  Patient Education: PT POC/GOAL, up with assist.  REQUIRES PT FOLLOW UP: Yes  Activity Tolerance  Activity Tolerance: Patient limited by fatigue;Patient limited by endurance         Plan   Plan  Times per week: 5 to 6 x/wk  Specific instructions for Next Treatment: Monitor sao2 with activity  Current Treatment Recommendations: Strengthening, Balance Training, Functional Mobility Training, Transfer Training, Endurance Training, Gait Training, Safety Education & Training, Patient/Caregiver Education & Training  Safety Devices  Type of devices: Call light within reach, Left in bed, Gait belt    G-Code  PT G-Codes  Functional Assessment Tool Used:  for complaints of abdominal pain, nausea, and vomiting  Score: 16  Functional Limitation: Mobility: Walking and moving around  Mobility: Walking and Moving Around Current Status (): At least 40 percent but less than 60 percent impaired, limited or restricted  Mobility: Walking and Moving Around Goal Status (): At least 1 percent but less than 20 percent impaired, limited or restricted  OutComes Score                                           AM-PAC Score     AM-PAC Inpatient Mobility without Stair Climbing Raw Score : 16  AM-PAC Inpatient without Stair Climbing T-Scale Score : 45.54  Mobility Inpatient CMS 0-100% Score: 40.64  Mobility Inpatient without Stair CMS G-Code Modifier : CK       Goals  Short term goals  Time Frame for Short term goals: 3 visits   Short term goal 1: Pt able to perfrom bed mobility/transfers at mod-I  Short term goal 2:  Pt able to ambulate with st can dist of 70 ft x 2, SBA with least amount of supplemental o2.    Short term goal 3: Pt able to tolerate activity for 30 minutes to improve endurance/strengh.   Patient Goals   Patient goals : Get stronger       Therapy Time   Individual Concurrent Group Co-treatment   Time In 1311         Time Out 1336         Minutes 25         Timed Code Treatment Minutes: 9 Minutes       Andra Keys, PT

## 2018-09-20 NOTE — PLAN OF CARE
Problem: Breathing Pattern - Ineffective:  Goal: Ability to achieve and maintain a regular respiratory rate will improve  Ability to achieve and maintain a regular respiratory rate will improve   Outcome: Met This Shift  Pt using oxygen at 3 lpm NC. Productive cough with slight SOB at times.

## 2018-09-20 NOTE — PROGRESS NOTES
[address]    Occupational Therapy Evaluation  Date: 18  Patient Name: Claire Moser       Room:   MRN: [id number]  Account: [de-identified]   : 1950  (79 y.o.) Gender: male     Discharge Recommendations:  [address]       Referring Practitioner: Dr. Awa Craig  Diagnosis: Abdominal pain and pneumonia       Treatment Diagnosis: Impaired self-care status. Past Medical History:  has a past medical history of Acid reflux; Anemia; Colon polyps; COPD (chronic obstructive pulmonary disease) ([address] and Rectal prolapse. Past Surgical History:   has a past surgical history that includes Tonsillectomy; eye surgery; Colonoscopy (2015); and Colonoscopy (2016). Restrictions  Implants present? : Metal implants (L eye)  Required Braces or Orthoses?: No     Vitals  Temp: 97.8 °F (36.6 °C)  Pulse: 81  Resp: 20  BP: 113/62  Height: 5' 8\" (172.7 cm)  Weight: 154 lb 8.7 oz (70.1 kg)  BMI (Calculated): 23.5  Oxygen Therapy  SpO2: 96 %  Pulse Oximeter Device Mode: Intermittent  Pulse Oximeter Device Location: Right  O2 Device: None (Room air)  O2 Flow Rate (L/min): 3 L/min  Level of Consciousness: Alert    Subjective  Subjective: \"I'm homeless\"  Comments: Pt reports living in a tent in his brother's backyard for last 4 weeks.      Vision  Vision: Impaired  Vision Exceptions:  (L glass eye)  Hearing  Hearing: Within functional limits  Social/Functional History  Lives With: Other (comment) (Homeless, lives at brother's backyard)  Type of Home: Homeless  Home Access: Stairs to enter without rails  Entrance Stairs - Number of Steps: 3  Bathroom Shower/Tub: Walk-in shower, Curtain  Bathroom Toilet: Standard  Bathroom Equipment: Grab bars around toilet, Grab bars in shower, Shower chair (Shower chair is in storage)  Home Equipment: [address]: Independent  Homemaking Assistance: Independent  Homemaking Responsibilities: Yes  Ambulation Assistance: assistance  Functional Mobility Comments: fair safety awareness noted. shortness of breath noted at end of functional mobility. Bed mobility  Rolling to Left: Stand by assistance  Rolling to Right: Stand by assistance  Supine to Sit: Stand by assistance  Sit to Supine: Stand by assistance     Transfers  Sit to stand: Contact guard assistance  Stand to sit: Contact guard assistance  Functional Activity Tolerance  Functional Activity Tolerance: Tolerates < 10 min exercise w/ changes in vital signs   Assessment  Performance deficits / Impairments: Decreased functional mobility , Decreased ADL status, Decreased strength, Decreased endurance, Decreased balance, Decreased high-level IADLs  Treatment Diagnosis: Impaired self-care status. Prognosis: Good  Decision Making: Low Complexity  Patient Education: OT POC, oxygen saturation  REQUIRES OT FOLLOW UP: Yes  Discharge Recommendations: Subacute/Skilled Nursing Facility  Activity Tolerance: Patient Tolerated treatment well, Patient limited by fatigue, Patient limited by pain    Goals  Patient Goals   Patient goals : To feel better  Short term goals  Time Frame for Short term goals: By discharge  Short term goal 1: Pt will perform lower body bathing/dressing and toileting tasks with SBA while maintaining oxygen saturation greater than 90%. Short term goal 2: Pt will perform functional transfers and mobility with cane and SBA while maintaining oxygen saturation greater than 90%. Short term goal 3: Pt will stand for 5+ minutes with 0-1 UE support and SBA while engaging in functional activity of choice. Short term goal 4: Pt will participate in 15+ minutes of therapeutic exercise/functional activities to promote increased IND with self-care and mobility.     Plan  Safety Devices  Safety Devices in place: Yes  Type of devices: Call light within reach, Gait belt, Left in bed, Nurse notified     Plan  Times per week: 5  Times per day: Daily  Current Treatment Recommendations: Strengthening, Balance Training, Functional Mobility Training, Endurance Training, Pain Management, Safety Education & Training, Patient/Caregiver Education & Training, Equipment Evaluation, Education, & procurement, Self-Care / ADL, Home Management Training    Equipment Recommendations  Equipment Needed:  (TBD)  OT Individual Minutes  Time In: 2859  Time Out: 2613  Minutes: 25    Electronically signed by Sandie Stacy OT on 9/20/18 at 3:28 PM

## 2018-09-20 NOTE — H&P
[address] [facility] [place] [facility]     HISTORY AND PHYSICAL EXAMINATION            Date:   9/20/2018  Patient name:  Reese Corbett  Date of admission:  9/19/2018  5:02 PM  MRN:   [id number]  Account:  [de-identified]  YOB: 1950  PCP:    Cuca Reeves MD  Room:   2119/2119-01  Code Status:    Full Code    CHIEF COMPLAINT     Abdominal pain, nausea, and vomiting    HISTORY OF PRESENT ILLNESS  (Character, Onset, Location, Duration,  Exacerbating/Relieving Factors, Radiation,   Associated Symptoms, Severity )      The patient is a 79 y.o.  male who presents as a direct admission From [facility] ED, where he was seen for complaints of abdominal pain, nausea, and vomiting. According to patient, he ate some yang bits last week that may have been past the expiration date. Shortly thereafter, he developed nausea, vomiting, and diarrhea. Reports that symptoms began to improve on 9/16, but the following day he developed a frequent productive cough, which worsened nausea and vomiting. Symptoms are associated with chills and intermittent headache. Denies chest pain, hematochezia, melena, and urinary symptoms. There are no aggravating or alleviating factors. Symptoms are reported as constant and moderate. According to EHR, patient was evaluated and treated at [address] [place] [address] ED in the a.m. However, no beds were available for admission, so patient was directly admitted to this facility. Patient reports that abdominal pain is greatly improved from this morning. PAST MEDICAL HISTORY   Patient  has a past medical history of Acid reflux; Anemia; Colon polyps; COPD (chronic obstructive pulmonary disease) ([address] and Rectal prolapse. PAST SURGICAL HISTORY    Patient  has a past surgical history that includes Tonsillectomy; eye surgery; Colonoscopy (8/11/2015); and Colonoscopy (1/5/2016). FAMILY HISTORY    Patient family history includes Diabetes in his brother.     SOCIAL HISTORY His behavior is normal. Thought content normal.     DIAGNOSTICS      EK2018  4:25 PM - Rocael, [facility] Incoming Ekg Results From [facility]     Component Value Ref Range & Units Status Collected Lab   Ventricular Rate 103  BPM Preliminary 2018  6:45    Atrial Rate 103  BPM Preliminary 2018  6:45    P-R Interval 146  ms Preliminary 2018  6:45    QRS Duration 68  ms Preliminary 2018  6:45    Q-T Interval 336  ms Preliminary 2018  6:45    QTc Calculation (Bazett) 440  ms Preliminary 2018  6:45    P [place] 67  degrees Preliminary 2018  6:45    R [place] 79  degrees Preliminary 2018  6:45    T [place] 70  degrees Preliminary 2018  6:45    Testing Performed By     Lab - Abbreviation Name Director Address Valid Date Range   212-Unknown [facility] STV MUSE Unknown Unknown 12 1755-Present   Narrative     Sinus tachycardia  Otherwise normal ECG  When compared with ECG of 05-AUG-2015 18:06,  No significant change was found     Labs:  CBC:   Recent Labs      18   0630   WBC  33.7*   HGB  15.5   PLT  337     BMP:    Recent Labs      18   0630   NA  131*   K  3.6*   CL  90*   CO2  24   BUN  9   CREATININE  0.60*   GLUCOSE  138*     S. Calcium:  Recent Labs      18   0630   CALCIUM  8.6     S. Ionized Calcium:No results for input(s): IONCA in the last 72 hours. S. Magnesium:  Recent Labs      18   0630   MG  2.1     S. Phosphorus:No results for input(s): PHOS in the last 72 hours. S. Glucose:No results for input(s): POCGLU in the last 72 hours. Glycosylated hemoglobin A1C: No results found for: LABA1C  Hepatic:   Recent Labs      18   0630   AST  62*   ALT  66*     CARDIAC ENZY: No results for input(s): CKTOTAL, CKMB, CKMBINDEX, TROPONINT, MYOGLOBIN in the last 72 hours. INR:   Recent Labs      18   0743   INR  1.1     BNP: No results for input(s): BNP in the last 72 hours.     Invalid disease. Attention to on follow-up is recommended. Mild relatively diffuse esophageal wall thickening. Question esophagitis. Moderate size hiatal hernia. Emphysema. ASSESSMENT  and  PLAN     Principal Problem:    Pneumonia  Active Problems:    Tobacco use    Abdominal pain  Resolved Problems:    * No resolved hospital problems. *    Plan:  Pneumonia   -CT chest concerning for infection  -WBC - 33.7  -IV Vancomycin initiated in [facility] ED  --continue on admission  -Pharmacy to dose Vancomycin  -Sputum C&S pending  -Recheck CXR in am  -Fluzone and Pneumovax before discharge    Abdominal Pain  -Denies urinary symptoms  -Lipase 17  -Zosyn administered at [address] [place] [address]  --Reports that abdominal pain is much improved  -continue Zosyn for now    Tobacco use   -smoking cessation education  -nicotine patch       copd   pneumonia   gastritis   svt  ghr 61  bp ok      Dehydration     add levaquin   protonix   duonebs   mucinex      lactic acid 2.3 to 1.4     wbc 43998 to 22     kub      d/c planning   homeless     Consultations:     [address] APRN - CNP   9/20/2018  3:55 AM    Claudia Wen [address] [place] [address], [address] [place] [address]. Phone [phone]   Attending Physician Statement  Patient seen and examined  I have discussed the care of the patient, including pertinent history and exam findings,  with the resident. I have reviewed the key elements of all parts of the encounter with the resident. I agree with the assessment, plan and orders as documented by the resident.     Prudence Thomas

## 2018-09-20 NOTE — PLAN OF CARE
Problem: Pain:  Goal: Pain level will decrease  Pain level will decrease   Outcome: Met This Shift  Pt denied pain this shift.

## 2018-09-20 NOTE — PROGRESS NOTES
Breath Sounds: clear  RR[de-identified] 18  Pulse Sat: 97% on room air  Home Meds: combivent and albuterol mdis prn. Pt states he has never filled prescription for inhalers    · Bronchodilator assessment at level:   · []    Home Level  BRONCHODILATOR ASSESSMENT SCORE  Score 0 1 2 3 4 5   Breath Sounds   []  Patient Baseline [x]  No Wheeze good aeration []  Faint, scattered wheezing, good aeration []  Expiratory Wheezing and or moderately diminished []  Insp/Exp wheeze and/or very diminished []  Insp/Exp and/ or marked distress   Respiratory Rate   []  Patient Baseline [x]  Less than 20 []  Less than 20 []  20-25 []  Greater than 25 []  Greater than 25   Peak flow % of Pred or PB []  NA   []  Greater than 90%  []  81-90% []  71-80% []  Less than or equal to 70%  or unable to perform []  Unable due to Respiratory Distress   Dyspnea re []  Patient Baseline [x]  No SOB []  No SOB []  SOB on exertion []  SOB min activity []  At rest/acute   e FEV% Predicted       []  NA []  Above 69%  []  Unable []  Above 60-69%  []  Unable []  Above 50-59%  []  Unable []  Above 35-49%  []  Unable []  Less than 35%  []  Unable        duoneb q6 prn and albuterol mdi already ordered.  Order appropriate at this time

## 2018-09-21 LAB
ABSOLUTE EOS #: 0 K/UL (ref 0–0.4)
ABSOLUTE IMMATURE GRANULOCYTE: ABNORMAL K/UL (ref 0–0.3)
ABSOLUTE LYMPH #: 1.81 K/UL (ref 1–4.8)
ABSOLUTE MONO #: 1.39 K/UL (ref 0.1–1.3)
ANION GAP SERPL CALCULATED.3IONS-SCNC: 12 MMOL/L (ref 9–17)
BASOPHILS # BLD: 0 % (ref 0–2)
BASOPHILS ABSOLUTE: 0 K/UL (ref 0–0.2)
BUN BLDV-MCNC: 5 MG/DL (ref 8–23)
BUN/CREAT BLD: ABNORMAL (ref 9–20)
CALCIUM SERPL-MCNC: 7.8 MG/DL (ref 8.6–10.4)
CHLORIDE BLD-SCNC: 102 MMOL/L (ref 98–107)
CO2: 23 MMOL/L (ref 20–31)
CREAT SERPL-MCNC: 0.53 MG/DL (ref 0.7–1.2)
DIFFERENTIAL TYPE: ABNORMAL
EOSINOPHILS RELATIVE PERCENT: 0 % (ref 0–4)
GFR AFRICAN AMERICAN: >60 ML/MIN
GFR NON-AFRICAN AMERICAN: >60 ML/MIN
GFR SERPL CREATININE-BSD FRML MDRD: ABNORMAL ML/MIN/{1.73_M2}
GFR SERPL CREATININE-BSD FRML MDRD: ABNORMAL ML/MIN/{1.73_M2}
GLUCOSE BLD-MCNC: 110 MG/DL (ref 70–99)
HCT VFR BLD CALC: 36.9 % (ref 41–53)
HEMOGLOBIN: 11.9 G/DL (ref 13.5–17.5)
IMMATURE GRANULOCYTES: ABNORMAL %
LYMPHOCYTES # BLD: 13 % (ref 24–44)
MCH RBC QN AUTO: 27.8 PG (ref 26–34)
MCHC RBC AUTO-ENTMCNC: 32.3 G/DL (ref 31–37)
MCV RBC AUTO: 86.3 FL (ref 80–100)
MONOCYTES # BLD: 10 % (ref 1–7)
MORPHOLOGY: NORMAL
NRBC AUTOMATED: ABNORMAL PER 100 WBC
PDW BLD-RTO: 14 % (ref 11.5–14.9)
PLATELET # BLD: 295 K/UL (ref 150–450)
PLATELET ESTIMATE: ABNORMAL
PMV BLD AUTO: 8.6 FL (ref 6–12)
POTASSIUM SERPL-SCNC: 3.7 MMOL/L (ref 3.7–5.3)
RBC # BLD: 4.28 M/UL (ref 4.5–5.9)
RBC # BLD: ABNORMAL 10*6/UL
SEG NEUTROPHILS: 77 % (ref 36–66)
SEGMENTED NEUTROPHILS ABSOLUTE COUNT: 10.7 K/UL (ref 1.3–9.1)
SODIUM BLD-SCNC: 137 MMOL/L (ref 135–144)
VANCOMYCIN TROUGH DATE LAST DOSE: ABNORMAL
VANCOMYCIN TROUGH DOSE AMOUNT: ABNORMAL
VANCOMYCIN TROUGH TIME LAST DOSE: 429
VANCOMYCIN TROUGH: 6.6 UG/ML (ref 10–20)
WBC # BLD: 13.9 K/UL (ref 3.5–11)
WBC # BLD: ABNORMAL 10*3/UL

## 2018-09-21 PROCEDURE — 80202 ASSAY OF VANCOMYCIN: CPT

## 2018-09-21 PROCEDURE — 2060000000 HC ICU INTERMEDIATE R&B

## 2018-09-21 PROCEDURE — 6370000000 HC RX 637 (ALT 250 FOR IP): Performed by: NURSE PRACTITIONER

## 2018-09-21 PROCEDURE — 6370000000 HC RX 637 (ALT 250 FOR IP): Performed by: INTERNAL MEDICINE

## 2018-09-21 PROCEDURE — 6360000002 HC RX W HCPCS: Performed by: NURSE PRACTITIONER

## 2018-09-21 PROCEDURE — 85025 COMPLETE CBC W/AUTO DIFF WBC: CPT

## 2018-09-21 PROCEDURE — 94761 N-INVAS EAR/PLS OXIMETRY MLT: CPT

## 2018-09-21 PROCEDURE — 36415 COLL VENOUS BLD VENIPUNCTURE: CPT

## 2018-09-21 PROCEDURE — 99233 SBSQ HOSP IP/OBS HIGH 50: CPT | Performed by: INTERNAL MEDICINE

## 2018-09-21 PROCEDURE — 6360000002 HC RX W HCPCS: Performed by: STUDENT IN AN ORGANIZED HEALTH CARE EDUCATION/TRAINING PROGRAM

## 2018-09-21 PROCEDURE — 94640 AIRWAY INHALATION TREATMENT: CPT

## 2018-09-21 PROCEDURE — 6360000002 HC RX W HCPCS: Performed by: INTERNAL MEDICINE

## 2018-09-21 PROCEDURE — 80048 BASIC METABOLIC PNL TOTAL CA: CPT

## 2018-09-21 PROCEDURE — 2580000003 HC RX 258: Performed by: NURSE PRACTITIONER

## 2018-09-21 PROCEDURE — 6370000000 HC RX 637 (ALT 250 FOR IP): Performed by: STUDENT IN AN ORGANIZED HEALTH CARE EDUCATION/TRAINING PROGRAM

## 2018-09-21 RX ORDER — BISACODYL 10 MG
10 SUPPOSITORY, RECTAL RECTAL DAILY PRN
Status: DISCONTINUED | OUTPATIENT
Start: 2018-09-21 | End: 2018-09-25 | Stop reason: HOSPADM

## 2018-09-21 RX ORDER — SENNA AND DOCUSATE SODIUM 50; 8.6 MG/1; MG/1
2 TABLET, FILM COATED ORAL DAILY PRN
Status: DISCONTINUED | OUTPATIENT
Start: 2018-09-21 | End: 2018-09-25 | Stop reason: HOSPADM

## 2018-09-21 RX ADMIN — FERROUS SULFATE TAB 325 MG (65 MG ELEMENTAL FE) 325 MG: 325 (65 FE) TAB at 08:55

## 2018-09-21 RX ADMIN — GUAIFENESIN 600 MG: 600 TABLET, EXTENDED RELEASE ORAL at 08:55

## 2018-09-21 RX ADMIN — PIPERACILLIN SODIUM AND TAZOBACTAM SODIUM 3.38 G: 3; .375 INJECTION, POWDER, LYOPHILIZED, FOR SOLUTION INTRAVENOUS at 20:30

## 2018-09-21 RX ADMIN — GUAIFENESIN 600 MG: 600 TABLET, EXTENDED RELEASE ORAL at 20:46

## 2018-09-21 RX ADMIN — IPRATROPIUM BROMIDE AND ALBUTEROL SULFATE 1 AMPULE: .5; 3 SOLUTION RESPIRATORY (INHALATION) at 10:50

## 2018-09-21 RX ADMIN — BISACODYL 10 MG: 10 SUPPOSITORY RECTAL at 18:29

## 2018-09-21 RX ADMIN — IPRATROPIUM BROMIDE AND ALBUTEROL SULFATE 1 AMPULE: .5; 3 SOLUTION RESPIRATORY (INHALATION) at 20:25

## 2018-09-21 RX ADMIN — PANTOPRAZOLE SODIUM 40 MG: 40 TABLET, DELAYED RELEASE ORAL at 05:48

## 2018-09-21 RX ADMIN — VANCOMYCIN HYDROCHLORIDE 1000 MG: 1 INJECTION, POWDER, LYOPHILIZED, FOR SOLUTION INTRAVENOUS at 04:29

## 2018-09-21 RX ADMIN — LEVOFLOXACIN 500 MG: 5 INJECTION, SOLUTION INTRAVENOUS at 12:32

## 2018-09-21 RX ADMIN — TAMSULOSIN HYDROCHLORIDE 0.4 MG: 0.4 CAPSULE ORAL at 08:55

## 2018-09-21 RX ADMIN — PIPERACILLIN SODIUM AND TAZOBACTAM SODIUM 3.38 G: 3; .375 INJECTION, POWDER, LYOPHILIZED, FOR SOLUTION INTRAVENOUS at 08:55

## 2018-09-21 RX ADMIN — SODIUM CHLORIDE: 9 INJECTION, SOLUTION INTRAVENOUS at 20:46

## 2018-09-21 RX ADMIN — IPRATROPIUM BROMIDE AND ALBUTEROL SULFATE 1 AMPULE: .5; 3 SOLUTION RESPIRATORY (INHALATION) at 14:57

## 2018-09-21 RX ADMIN — ENOXAPARIN SODIUM 40 MG: 40 INJECTION SUBCUTANEOUS at 08:55

## 2018-09-21 RX ADMIN — FERROUS SULFATE TAB 325 MG (65 MG ELEMENTAL FE) 325 MG: 325 (65 FE) TAB at 18:24

## 2018-09-21 RX ADMIN — DOCUSATE SODIUM AND SENNOSIDES 2 TABLET: 8.6; 5 TABLET, FILM COATED ORAL at 12:32

## 2018-09-21 RX ADMIN — PIPERACILLIN SODIUM AND TAZOBACTAM SODIUM 3.38 G: 3; .375 INJECTION, POWDER, LYOPHILIZED, FOR SOLUTION INTRAVENOUS at 14:36

## 2018-09-21 RX ADMIN — PIPERACILLIN SODIUM AND TAZOBACTAM SODIUM 3.38 G: 3; .375 INJECTION, POWDER, LYOPHILIZED, FOR SOLUTION INTRAVENOUS at 02:04

## 2018-09-21 RX ADMIN — IPRATROPIUM BROMIDE AND ALBUTEROL SULFATE 1 AMPULE: .5; 3 SOLUTION RESPIRATORY (INHALATION) at 07:07

## 2018-09-21 ASSESSMENT — ENCOUNTER SYMPTOMS
BACK PAIN: 0
NAUSEA: 1
CONSTIPATION: 0
DOUBLE VISION: 0
DIARRHEA: 1
VOMITING: 1
ORTHOPNEA: 0
WHEEZING: 0
ABDOMINAL PAIN: 1
COUGH: 1
SORE THROAT: 0
SPUTUM PRODUCTION: 1
BLURRED VISION: 0
SHORTNESS OF BREATH: 1

## 2018-09-21 NOTE — H&P
vomiting. Negative for constipation. Genitourinary: Negative for dysuria, frequency and urgency. Musculoskeletal: Negative for back pain, falls and myalgias. Skin: Negative for itching and rash. Neurological: Negative for dizziness, sensory change, focal weakness, weakness and headaches. Psychiatric/Behavioral: Negative for depression and substance abuse. The patient is not nervous/anxious. PHYSICAL EXAM      /80   Pulse 80   Temp 97.3 °F (36.3 °C) (Oral)   Resp 18   Ht 5' 8\" (1.727 m)   Wt 154 lb 8 oz (70.1 kg)   SpO2 97%   BMI 23.49 kg/m²  Body mass index is 23.49 kg/m². Physical Exam   Constitutional: He is oriented to person, place, and time. He appears well-developed and well-nourished. No distress. HENT:   Head: Normocephalic and atraumatic. Mouth/Throat: Oropharynx is clear and moist.   Eyes: Pupils are equal, round, and reactive to light. Conjunctivae and EOM are normal.   Neck: Normal range of motion. Neck supple. No tracheal deviation present. Cardiovascular: Normal rate, regular rhythm, normal heart sounds and intact distal pulses. Exam reveals no gallop and no friction rub. No murmur heard. Pulmonary/Chest: Effort normal. No respiratory distress. He has no wheezes. He has no rales. He exhibits no tenderness. Breath sounds diminished throughout with few scattered rhonchi. No wheezes or rales audible. Abdominal: Soft. Bowel sounds are normal. He exhibits no distension. There is tenderness. There is no guarding. Abdomen mildly distended with periumbilical tenderness. Moderate amount emesis of undigested food noted in patient's wash basin. Musculoskeletal: Normal range of motion. He exhibits no edema or tenderness. Lymphadenopathy:     He has no cervical adenopathy. Neurological: He is alert and oriented to person, place, and time. Skin: Skin is warm and dry. No rash noted. He is not diaphoretic. No erythema. No pallor.    Psychiatric: He has a normal mood and affect. His behavior is normal. Thought content normal.     DIAGNOSTICS      EK2018  4:25 PM - [facility], [facility] Incoming Ekg Results From [facility]     Component Value Ref Range & Units Status Collected Lab   Ventricular Rate 103  BPM Preliminary 2018  6:45    Atrial Rate 103  BPM Preliminary 2018  6:45    P-R Interval 146  ms Preliminary 2018  6:45    QRS Duration 68  ms Preliminary 2018  6:45    Q-T Interval 336  ms Preliminary 2018  6:45    QTc Calculation (Bazett) 440  ms Preliminary 2018  6:45    P [place] 67  degrees Preliminary 2018  6:45    R [place] 79  degrees Preliminary 2018  6:45    T [place] 70  degrees Preliminary 2018  6:45    Testing Performed By     Lab - Abbreviation Name Director Address Valid Date Range   212-Unknown [facility] STV MUSE Unknown Unknown 12 1755-Present   Narrative     Sinus tachycardia  Otherwise normal ECG  When compared with ECG of 05-AUG-2015 18:06,  No significant change was found     Labs:  CBC:   Recent Labs      18   0630  18   0512  18   0526   WBC  33.7*  22.7*  13.9*   HGB  15.5  12.7*  11.9*   PLT  337  289  295     BMP:    Recent Labs      18   0630  18   0512  18   0526   NA  131*  136  137   K  3.6*  4.2  3.7   CL  90*  99  102   CO2  24  27  23   BUN  9  12  5*   CREATININE  0.60*  0.56*  0.53*   GLUCOSE  138*  150*  110*     S. Calcium:  Recent Labs      18   0526   CALCIUM  7.8*     S. Ionized Calcium:No results for input(s): IONCA in the last 72 hours. S. Magnesium:  Recent Labs      18   0630   MG  2.1     S. Phosphorus:No results for input(s): PHOS in the last 72 hours. S. Glucose:No results for input(s): POCGLU in the last 72 hours.   Glycosylated hemoglobin A1C: No results found for: LABA1C  Hepatic:   Recent Labs      18   0630   AST  62*   ALT  66*     CARDIAC ENZY: No results for input(s): CKTOTAL, CKMB, CKMBINDEX, TROPONINT, MYOGLOBIN in the last 72 hours. INR:   Recent Labs      09/19/18   0743   INR  1.1     BNP: No results for input(s): BNP in the last 72 hours. Invalid input(s):  PROBNP  ABGs: No results for input(s): PH, PCO2, PO2, HCO3, O2SAT in the last 72 hours. Lipids: No results for input(s): CHOL, TRIG, HDL, LDLCALC in the last 72 hours. Invalid input(s): LDL  Pancreatic functions:  Recent Labs      09/19/18   0630   LIPASE  17     S. Lactic Acid: No results for input(s): LACTA in the last 72 hours. Thyroid functions: No results found for: TSH   U/A:No results for input(s): NITRITE, COLORU, WBCUA, RBCUA, MUCUS, BACTERIA, CLARITYU, SPECGRAV, LEUKOCYTESUR, BLOODU, GLUCOSEU, AMORPHOUS in the last 72 hours. Invalid input(s): Moni Rodriguez    Imaging/Diagonstics:     Xr Chest Standard (2 Vw)    Result Date: 9/19/2018  EXAMINATION: TWO VIEWS OF THE CHEST 9/19/2018 6:37 am COMPARISON: Chest x-ray from 08/16/2015. HISTORY: ORDERING SYSTEM PROVIDED HISTORY: Cough, SOB TECHNOLOGIST PROVIDED HISTORY: Cough, SOB FINDINGS: Lungs are hyperexpanded with flattening of the hemidiaphragms. There is no focal airspace consolidation, pleural effusion or pneumothorax. The cardiomediastinal silhouette appears within normal limits. There is similar prominence of the pulmonary vascular/interstitial markings. Visualized osseous structures appear intact, and grossly unremarkable, given the non dedicated imaging. 1. Similar prominence of the interstitial/pulmonary vascular markings can be seen in setting of chronic interstitial disease, mild vascular congestion and/or atypical pneumonitis. No focal airspace consolidation. 2. Emphysematous changes also identified.      Ct Chest Pulmonary Embolism W Contrast    Result Date: 9/19/2018  EXAMINATION: CTA OF THE CHEST 9/19/2018 8:40 am TECHNIQUE: CTA of the chest was performed after the administration of intravenous contrast.  Multiplanar

## 2018-09-22 LAB
ABSOLUTE EOS #: 0.17 K/UL (ref 0–0.4)
ABSOLUTE IMMATURE GRANULOCYTE: ABNORMAL K/UL (ref 0–0.3)
ABSOLUTE LYMPH #: 1.65 K/UL (ref 1–4.8)
ABSOLUTE MONO #: 1.16 K/UL (ref 0.1–1.3)
ANION GAP SERPL CALCULATED.3IONS-SCNC: 11 MMOL/L (ref 9–17)
BASOPHILS # BLD: 1 % (ref 0–2)
BASOPHILS ABSOLUTE: 0.17 K/UL (ref 0–0.2)
BUN BLDV-MCNC: 4 MG/DL (ref 8–23)
BUN/CREAT BLD: ABNORMAL (ref 9–20)
CALCIUM SERPL-MCNC: 8.2 MG/DL (ref 8.6–10.4)
CHLORIDE BLD-SCNC: 104 MMOL/L (ref 98–107)
CO2: 23 MMOL/L (ref 20–31)
CREAT SERPL-MCNC: 0.48 MG/DL (ref 0.7–1.2)
CULTURE: NORMAL
DIFFERENTIAL TYPE: ABNORMAL
DIRECT EXAM: NORMAL
EOSINOPHILS RELATIVE PERCENT: 1 % (ref 0–4)
GFR AFRICAN AMERICAN: >60 ML/MIN
GFR NON-AFRICAN AMERICAN: >60 ML/MIN
GFR SERPL CREATININE-BSD FRML MDRD: ABNORMAL ML/MIN/{1.73_M2}
GFR SERPL CREATININE-BSD FRML MDRD: ABNORMAL ML/MIN/{1.73_M2}
GLUCOSE BLD-MCNC: 100 MG/DL (ref 70–99)
HCT VFR BLD CALC: 39 % (ref 41–53)
HEMOGLOBIN: 12.6 G/DL (ref 13.5–17.5)
IMMATURE GRANULOCYTES: ABNORMAL %
LYMPHOCYTES # BLD: 10 % (ref 24–44)
Lab: NORMAL
MCH RBC QN AUTO: 27.9 PG (ref 26–34)
MCHC RBC AUTO-ENTMCNC: 32.4 G/DL (ref 31–37)
MCV RBC AUTO: 86.3 FL (ref 80–100)
MONOCYTES # BLD: 7 % (ref 1–7)
MORPHOLOGY: NORMAL
NRBC AUTOMATED: ABNORMAL PER 100 WBC
PDW BLD-RTO: 14.4 % (ref 11.5–14.9)
PLATELET # BLD: 364 K/UL (ref 150–450)
PLATELET ESTIMATE: ABNORMAL
PMV BLD AUTO: 8.3 FL (ref 6–12)
POTASSIUM SERPL-SCNC: 4.2 MMOL/L (ref 3.7–5.3)
RBC # BLD: 4.52 M/UL (ref 4.5–5.9)
RBC # BLD: ABNORMAL 10*6/UL
SEG NEUTROPHILS: 81 % (ref 36–66)
SEGMENTED NEUTROPHILS ABSOLUTE COUNT: 13.35 K/UL (ref 1.3–9.1)
SODIUM BLD-SCNC: 138 MMOL/L (ref 135–144)
SPECIMEN DESCRIPTION: NORMAL
STATUS: NORMAL
WBC # BLD: 16.5 K/UL (ref 3.5–11)
WBC # BLD: ABNORMAL 10*3/UL

## 2018-09-22 PROCEDURE — 97116 GAIT TRAINING THERAPY: CPT

## 2018-09-22 PROCEDURE — 97530 THERAPEUTIC ACTIVITIES: CPT

## 2018-09-22 PROCEDURE — 2580000003 HC RX 258: Performed by: NURSE PRACTITIONER

## 2018-09-22 PROCEDURE — 80048 BASIC METABOLIC PNL TOTAL CA: CPT

## 2018-09-22 PROCEDURE — 2700000000 HC OXYGEN THERAPY PER DAY

## 2018-09-22 PROCEDURE — 2060000000 HC ICU INTERMEDIATE R&B

## 2018-09-22 PROCEDURE — 6360000002 HC RX W HCPCS: Performed by: STUDENT IN AN ORGANIZED HEALTH CARE EDUCATION/TRAINING PROGRAM

## 2018-09-22 PROCEDURE — 85025 COMPLETE CBC W/AUTO DIFF WBC: CPT

## 2018-09-22 PROCEDURE — 99233 SBSQ HOSP IP/OBS HIGH 50: CPT | Performed by: INTERNAL MEDICINE

## 2018-09-22 PROCEDURE — 6360000002 HC RX W HCPCS: Performed by: NURSE PRACTITIONER

## 2018-09-22 PROCEDURE — 6360000002 HC RX W HCPCS: Performed by: INTERNAL MEDICINE

## 2018-09-22 PROCEDURE — 6370000000 HC RX 637 (ALT 250 FOR IP): Performed by: NURSE PRACTITIONER

## 2018-09-22 PROCEDURE — 94640 AIRWAY INHALATION TREATMENT: CPT

## 2018-09-22 PROCEDURE — 6370000000 HC RX 637 (ALT 250 FOR IP): Performed by: INTERNAL MEDICINE

## 2018-09-22 PROCEDURE — 94761 N-INVAS EAR/PLS OXIMETRY MLT: CPT

## 2018-09-22 PROCEDURE — 36415 COLL VENOUS BLD VENIPUNCTURE: CPT

## 2018-09-22 PROCEDURE — 6370000000 HC RX 637 (ALT 250 FOR IP): Performed by: STUDENT IN AN ORGANIZED HEALTH CARE EDUCATION/TRAINING PROGRAM

## 2018-09-22 RX ADMIN — FERROUS SULFATE TAB 325 MG (65 MG ELEMENTAL FE) 325 MG: 325 (65 FE) TAB at 08:45

## 2018-09-22 RX ADMIN — PIPERACILLIN SODIUM AND TAZOBACTAM SODIUM 3.38 G: 3; .375 INJECTION, POWDER, LYOPHILIZED, FOR SOLUTION INTRAVENOUS at 01:39

## 2018-09-22 RX ADMIN — ENOXAPARIN SODIUM 40 MG: 40 INJECTION SUBCUTANEOUS at 08:45

## 2018-09-22 RX ADMIN — TAMSULOSIN HYDROCHLORIDE 0.4 MG: 0.4 CAPSULE ORAL at 08:45

## 2018-09-22 RX ADMIN — GUAIFENESIN 600 MG: 600 TABLET, EXTENDED RELEASE ORAL at 19:48

## 2018-09-22 RX ADMIN — BENZONATATE 200 MG: 100 CAPSULE ORAL at 23:08

## 2018-09-22 RX ADMIN — LEVOFLOXACIN 500 MG: 5 INJECTION, SOLUTION INTRAVENOUS at 11:30

## 2018-09-22 RX ADMIN — IPRATROPIUM BROMIDE AND ALBUTEROL SULFATE 1 AMPULE: .5; 3 SOLUTION RESPIRATORY (INHALATION) at 15:18

## 2018-09-22 RX ADMIN — SODIUM CHLORIDE: 9 INJECTION, SOLUTION INTRAVENOUS at 05:59

## 2018-09-22 RX ADMIN — SODIUM CHLORIDE: 9 INJECTION, SOLUTION INTRAVENOUS at 23:37

## 2018-09-22 RX ADMIN — IPRATROPIUM BROMIDE AND ALBUTEROL SULFATE 1 AMPULE: .5; 3 SOLUTION RESPIRATORY (INHALATION) at 08:42

## 2018-09-22 RX ADMIN — PANTOPRAZOLE SODIUM 40 MG: 40 TABLET, DELAYED RELEASE ORAL at 05:59

## 2018-09-22 RX ADMIN — FERROUS SULFATE TAB 325 MG (65 MG ELEMENTAL FE) 325 MG: 325 (65 FE) TAB at 18:21

## 2018-09-22 RX ADMIN — GUAIFENESIN 600 MG: 600 TABLET, EXTENDED RELEASE ORAL at 08:45

## 2018-09-22 RX ADMIN — IPRATROPIUM BROMIDE AND ALBUTEROL SULFATE 1 AMPULE: .5; 3 SOLUTION RESPIRATORY (INHALATION) at 20:09

## 2018-09-22 RX ADMIN — ONDANSETRON 4 MG: 2 INJECTION INTRAMUSCULAR; INTRAVENOUS at 14:32

## 2018-09-22 RX ADMIN — PIPERACILLIN SODIUM AND TAZOBACTAM SODIUM 3.38 G: 3; .375 INJECTION, POWDER, LYOPHILIZED, FOR SOLUTION INTRAVENOUS at 08:51

## 2018-09-22 RX ADMIN — PIPERACILLIN SODIUM AND TAZOBACTAM SODIUM 3.38 G: 3; .375 INJECTION, POWDER, LYOPHILIZED, FOR SOLUTION INTRAVENOUS at 14:44

## 2018-09-22 RX ADMIN — PIPERACILLIN SODIUM AND TAZOBACTAM SODIUM 3.38 G: 3; .375 INJECTION, POWDER, LYOPHILIZED, FOR SOLUTION INTRAVENOUS at 19:47

## 2018-09-22 RX ADMIN — IPRATROPIUM BROMIDE AND ALBUTEROL SULFATE 1 AMPULE: .5; 3 SOLUTION RESPIRATORY (INHALATION) at 11:20

## 2018-09-22 RX ADMIN — SODIUM CHLORIDE: 9 INJECTION, SOLUTION INTRAVENOUS at 14:38

## 2018-09-22 NOTE — PROGRESS NOTES
2LNC 95% HR 96 UPPER LOBES CLEAR DIMINISHED LOWER AND MIDDLE LOBES FINE CRACKLES   PT HAS DRY HACKING COUGH

## 2018-09-22 NOTE — CARE COORDINATION
ONGOING DISCHARGE PLAN:    Spoke with patient regarding discharge plan and patient confirms that plan is home with vns [facility]  PT saw and is recommending ECF, LSW to see, ERIK started  Here with multifocal pneumonia, ? Silent aspiration  Is on IV levaquin and zosyn, and aerosols  Will continue to follow for additional discharge needs.     Electronically signed by Rosemary Johnston RN on 9/22/2018 at 3:40 PM

## 2018-09-23 ENCOUNTER — APPOINTMENT (OUTPATIENT)
Dept: CT IMAGING | Age: 68
DRG: 179 | End: 2018-09-23
Attending: INTERNAL MEDICINE
Payer: MEDICARE

## 2018-09-23 LAB
ABSOLUTE EOS #: 0.3 K/UL (ref 0–0.4)
ABSOLUTE IMMATURE GRANULOCYTE: ABNORMAL K/UL (ref 0–0.3)
ABSOLUTE LYMPH #: 1.7 K/UL (ref 1–4.8)
ABSOLUTE MONO #: 1 K/UL (ref 0.1–1.3)
ANION GAP SERPL CALCULATED.3IONS-SCNC: 10 MMOL/L (ref 9–17)
BASOPHILS # BLD: 1 % (ref 0–2)
BASOPHILS ABSOLUTE: 0.1 K/UL (ref 0–0.2)
BUN BLDV-MCNC: 3 MG/DL (ref 8–23)
BUN/CREAT BLD: ABNORMAL (ref 9–20)
CALCIUM SERPL-MCNC: 8.1 MG/DL (ref 8.6–10.4)
CHLORIDE BLD-SCNC: 103 MMOL/L (ref 98–107)
CO2: 23 MMOL/L (ref 20–31)
CREAT SERPL-MCNC: 0.51 MG/DL (ref 0.7–1.2)
DIFFERENTIAL TYPE: ABNORMAL
EOSINOPHILS RELATIVE PERCENT: 2 % (ref 0–4)
GFR AFRICAN AMERICAN: >60 ML/MIN
GFR NON-AFRICAN AMERICAN: >60 ML/MIN
GFR SERPL CREATININE-BSD FRML MDRD: ABNORMAL ML/MIN/{1.73_M2}
GFR SERPL CREATININE-BSD FRML MDRD: ABNORMAL ML/MIN/{1.73_M2}
GLUCOSE BLD-MCNC: 95 MG/DL (ref 70–99)
HCT VFR BLD CALC: 39.4 % (ref 41–53)
HEMOGLOBIN: 12.9 G/DL (ref 13.5–17.5)
IMMATURE GRANULOCYTES: ABNORMAL %
LYMPHOCYTES # BLD: 13 % (ref 24–44)
MCH RBC QN AUTO: 28.1 PG (ref 26–34)
MCHC RBC AUTO-ENTMCNC: 32.8 G/DL (ref 31–37)
MCV RBC AUTO: 85.7 FL (ref 80–100)
MONOCYTES # BLD: 8 % (ref 1–7)
NRBC AUTOMATED: ABNORMAL PER 100 WBC
PDW BLD-RTO: 14.2 % (ref 11.5–14.9)
PLATELET # BLD: 366 K/UL (ref 150–450)
PLATELET ESTIMATE: ABNORMAL
PMV BLD AUTO: 8.2 FL (ref 6–12)
POTASSIUM SERPL-SCNC: 4.2 MMOL/L (ref 3.7–5.3)
RBC # BLD: 4.6 M/UL (ref 4.5–5.9)
RBC # BLD: ABNORMAL 10*6/UL
SEG NEUTROPHILS: 76 % (ref 36–66)
SEGMENTED NEUTROPHILS ABSOLUTE COUNT: 9.9 K/UL (ref 1.3–9.1)
SODIUM BLD-SCNC: 136 MMOL/L (ref 135–144)
WBC # BLD: 12.9 K/UL (ref 3.5–11)
WBC # BLD: ABNORMAL 10*3/UL

## 2018-09-23 PROCEDURE — 6370000000 HC RX 637 (ALT 250 FOR IP): Performed by: INTERNAL MEDICINE

## 2018-09-23 PROCEDURE — 6360000004 HC RX CONTRAST MEDICATION: Performed by: SURGERY

## 2018-09-23 PROCEDURE — 74177 CT ABD & PELVIS W/CONTRAST: CPT

## 2018-09-23 PROCEDURE — 6360000002 HC RX W HCPCS: Performed by: INTERNAL MEDICINE

## 2018-09-23 PROCEDURE — 2580000003 HC RX 258: Performed by: NURSE PRACTITIONER

## 2018-09-23 PROCEDURE — 6370000000 HC RX 637 (ALT 250 FOR IP): Performed by: STUDENT IN AN ORGANIZED HEALTH CARE EDUCATION/TRAINING PROGRAM

## 2018-09-23 PROCEDURE — 2700000000 HC OXYGEN THERAPY PER DAY

## 2018-09-23 PROCEDURE — 6360000004 HC RX CONTRAST MEDICATION

## 2018-09-23 PROCEDURE — 6370000000 HC RX 637 (ALT 250 FOR IP): Performed by: NURSE PRACTITIONER

## 2018-09-23 PROCEDURE — 94760 N-INVAS EAR/PLS OXIMETRY 1: CPT

## 2018-09-23 PROCEDURE — 80048 BASIC METABOLIC PNL TOTAL CA: CPT

## 2018-09-23 PROCEDURE — 36415 COLL VENOUS BLD VENIPUNCTURE: CPT

## 2018-09-23 PROCEDURE — 85025 COMPLETE CBC W/AUTO DIFF WBC: CPT

## 2018-09-23 PROCEDURE — 6360000002 HC RX W HCPCS: Performed by: NURSE PRACTITIONER

## 2018-09-23 PROCEDURE — 2580000003 HC RX 258: Performed by: SURGERY

## 2018-09-23 PROCEDURE — 99232 SBSQ HOSP IP/OBS MODERATE 35: CPT | Performed by: INTERNAL MEDICINE

## 2018-09-23 PROCEDURE — 94761 N-INVAS EAR/PLS OXIMETRY MLT: CPT

## 2018-09-23 PROCEDURE — 94640 AIRWAY INHALATION TREATMENT: CPT

## 2018-09-23 PROCEDURE — 6360000002 HC RX W HCPCS: Performed by: STUDENT IN AN ORGANIZED HEALTH CARE EDUCATION/TRAINING PROGRAM

## 2018-09-23 PROCEDURE — 97116 GAIT TRAINING THERAPY: CPT

## 2018-09-23 PROCEDURE — 2060000000 HC ICU INTERMEDIATE R&B

## 2018-09-23 RX ORDER — 0.9 % SODIUM CHLORIDE 0.9 %
80 INTRAVENOUS SOLUTION INTRAVENOUS ONCE
Status: COMPLETED | OUTPATIENT
Start: 2018-09-23 | End: 2018-09-23

## 2018-09-23 RX ADMIN — ONDANSETRON 4 MG: 2 INJECTION INTRAMUSCULAR; INTRAVENOUS at 11:15

## 2018-09-23 RX ADMIN — GUAIFENESIN 600 MG: 600 TABLET, EXTENDED RELEASE ORAL at 08:30

## 2018-09-23 RX ADMIN — FERROUS SULFATE TAB 325 MG (65 MG ELEMENTAL FE) 325 MG: 325 (65 FE) TAB at 08:30

## 2018-09-23 RX ADMIN — IPRATROPIUM BROMIDE AND ALBUTEROL SULFATE 1 AMPULE: .5; 3 SOLUTION RESPIRATORY (INHALATION) at 19:04

## 2018-09-23 RX ADMIN — IPRATROPIUM BROMIDE AND ALBUTEROL SULFATE 1 AMPULE: .5; 3 SOLUTION RESPIRATORY (INHALATION) at 08:37

## 2018-09-23 RX ADMIN — SODIUM CHLORIDE 80 ML: 9 INJECTION, SOLUTION INTRAVENOUS at 14:24

## 2018-09-23 RX ADMIN — PIPERACILLIN SODIUM AND TAZOBACTAM SODIUM 3.38 G: 3; .375 INJECTION, POWDER, LYOPHILIZED, FOR SOLUTION INTRAVENOUS at 14:51

## 2018-09-23 RX ADMIN — PIPERACILLIN SODIUM AND TAZOBACTAM SODIUM 3.38 G: 3; .375 INJECTION, POWDER, LYOPHILIZED, FOR SOLUTION INTRAVENOUS at 01:38

## 2018-09-23 RX ADMIN — PIPERACILLIN SODIUM AND TAZOBACTAM SODIUM 3.38 G: 3; .375 INJECTION, POWDER, LYOPHILIZED, FOR SOLUTION INTRAVENOUS at 08:45

## 2018-09-23 RX ADMIN — SODIUM CHLORIDE: 9 INJECTION, SOLUTION INTRAVENOUS at 18:08

## 2018-09-23 RX ADMIN — GUAIFENESIN 600 MG: 600 TABLET, EXTENDED RELEASE ORAL at 20:20

## 2018-09-23 RX ADMIN — PIPERACILLIN SODIUM AND TAZOBACTAM SODIUM 3.38 G: 3; .375 INJECTION, POWDER, LYOPHILIZED, FOR SOLUTION INTRAVENOUS at 20:10

## 2018-09-23 RX ADMIN — IPRATROPIUM BROMIDE AND ALBUTEROL SULFATE 1 AMPULE: .5; 3 SOLUTION RESPIRATORY (INHALATION) at 14:55

## 2018-09-23 RX ADMIN — SODIUM CHLORIDE: 9 INJECTION, SOLUTION INTRAVENOUS at 08:30

## 2018-09-23 RX ADMIN — TAMSULOSIN HYDROCHLORIDE 0.4 MG: 0.4 CAPSULE ORAL at 08:30

## 2018-09-23 RX ADMIN — LEVOFLOXACIN 500 MG: 5 INJECTION, SOLUTION INTRAVENOUS at 12:41

## 2018-09-23 RX ADMIN — BENZONATATE 200 MG: 100 CAPSULE ORAL at 23:17

## 2018-09-23 RX ADMIN — PANTOPRAZOLE SODIUM 40 MG: 40 TABLET, DELAYED RELEASE ORAL at 06:13

## 2018-09-23 RX ADMIN — FERROUS SULFATE TAB 325 MG (65 MG ELEMENTAL FE) 325 MG: 325 (65 FE) TAB at 16:30

## 2018-09-23 RX ADMIN — IOPAMIDOL 75 ML: 755 INJECTION, SOLUTION INTRAVENOUS at 14:24

## 2018-09-23 RX ADMIN — IOHEXOL 50 ML: 240 INJECTION, SOLUTION INTRATHECAL; INTRAVASCULAR; INTRAVENOUS; ORAL at 11:13

## 2018-09-23 NOTE — PLAN OF CARE
Problem: Pain:  Goal: Pain level will decrease  Pain level will decrease   Outcome: Met This Shift  No pain at this time. 0/10 pain scale. Will continue to monitor. Problem: Breathing Pattern - Ineffective:  Goal: Ability to achieve and maintain a regular respiratory rate will improve  Ability to achieve and maintain a regular respiratory rate will improve   Outcome: Ongoing  Patient resp rate 20/min; pulse ox 95% on RA; expiratory wheezes present; will continue to monitor oxygenation. No respiratory distress noted at this time. Problem: Skin Integrity:  Goal: Will show no infection signs and symptoms  Will show no infection signs and symptoms   Outcome: Met This Shift  Patient remains afebrile with stable vital signs. Will continue to monitor.

## 2018-09-23 NOTE — PROGRESS NOTES
Physical Therapy  Facility/Department: [facility]  Daily Treatment Note  NAME: Vladimir Sharma  : 1950  MRN: [id number]    Date of Service: 2018    Discharge Recommendations:  ECF with PT   PT Equipment Recommendations  Equipment Needed: Yes  Mobility Devices: Cynthia Mering: Rolling    Patient Diagnosis(es): There were no encounter diagnoses. has a past medical history of Acid reflux; Anemia; Colon polyps; COPD (chronic obstructive pulmonary disease) ([address] and Rectal prolapse.   has a past surgical history that includes Tonsillectomy; eye surgery; Colonoscopy (2015); and Colonoscopy (2016). Restrictions  Restrictions/Precautions  Restrictions/Precautions: Fall Risk  Required Braces or Orthoses?: No  Implants present? : Metal implants  Subjective   Subjective  Subjective: no complains of pain  Pain Screening  Patient Currently in Pain: No  Vital Signs  Patient Currently in Pain: No       Orientation  Orientation  Overall Orientation Status: Within Normal Limits  Objective      Transfers  Sit to Stand: Independent  Stand to sit:  Independent  Ambulation  Ambulation?: Yes  More Ambulation?: Yes  Ambulation 1  Surface: level tile  Device: Single point cane  Assistance: Contact guard assistance  Quality of Gait: gait slow but able to hold balance  Distance: 200ft  Ambulation 2  Surface - 2: level tile  Device 2: Rolling Walker  Assistance 2: Stand by assistance  Distance: 40ft  Comments: gait balance maintained better  Stairs/Curb  Stairs?: Yes  Stairs  # Steps : 8  Stairs Height: 6\"  Rails: Right ascending  Device: Single pt cane  Assistance: Contact guard assistance     Assessment   Assessment: patient needs a RW for home use for stability in gait  REQUIRES PT FOLLOW UP: Yes  Activity Tolerance  Activity Tolerance: Patient limited by endurance     Goals  Short term goals  Time Frame for Short term goals: 3 visits   Short term goal 1: Pt able to perfrom bed mobility/transfers at

## 2018-09-23 NOTE — CONSULTS
Magnesium:    Lab Results   Component Value Date    MG 2.1 09/19/2018     Phosphorus:  No results found for: PHOS  PT/INR:    Lab Results   Component Value Date    PROTIME 11.7 09/19/2018    INR 1.1 09/19/2018     ABG:  No results found for: PHART, PH, DNL4ILG, PCO2, PO2ART, PO2, LTZ3YOT, HCO3, BEART, BE, THGBART, THB, TXB7BPL, G0NCRPRW, O2SAT  Urine Culture:  No components found for: CURINE  Blood Culture:  No components found for: CBLOOD, CFUNGUSBL  Stool Culture:  No components found for: CSTOOL    RADIOLOGY:   I have personally reviewed the following films:  Xr Chest Standard (2 Vw)    Result Date: 9/19/2018  EXAMINATION: TWO VIEWS OF THE CHEST 9/19/2018 6:37 am COMPARISON: Chest x-ray from 08/16/2015. HISTORY: ORDERING SYSTEM PROVIDED HISTORY: Cough, SOB TECHNOLOGIST PROVIDED HISTORY: Cough, SOB FINDINGS: Lungs are hyperexpanded with flattening of the hemidiaphragms. There is no focal airspace consolidation, pleural effusion or pneumothorax. The cardiomediastinal silhouette appears within normal limits. There is similar prominence of the pulmonary vascular/interstitial markings. Visualized osseous structures appear intact, and grossly unremarkable, given the non dedicated imaging. 1. Similar prominence of the interstitial/pulmonary vascular markings can be seen in setting of chronic interstitial disease, mild vascular congestion and/or atypical pneumonitis. No focal airspace consolidation. 2. Emphysematous changes also identified. Xr Abdomen (kub) (single Ap View)    Result Date: 9/20/2018  EXAMINATION: SINGLE SUPINE XRAY VIEW(S) OF THE ABDOMEN, 9/20/2018 10:46 am COMPARISON: Acute abdominal series dated 08/21/2015 HISTORY: ORDERING SYSTEM PROVIDED HISTORY: abd pain n/v TECHNOLOGIST PROVIDED HISTORY: abd pain n/v Ordering Physician Provided Reason for Exam: PT CO abdominal pain with N/V X several days. \"PT states this all started from eating bad yang bits\".  Acuity: Acute Type of Exam: Ongoing FINDINGS: There is a nonspecific, nonobstructed bowel gas pattern. Gas is scattered throughout the small and large bowel loops. There is a moderate amount of fecal material in the colon. No evidence of acute osseous abnormality. Nonspecific, nonobstructed bowel gas pattern. Xr Chest Portable    Result Date: 9/20/2018  EXAMINATION: SINGLE XRAY VIEW OF THE CHEST 9/20/2018 6:54 am COMPARISON: Chest September 19, 2018. HISTORY: ORDERING SYSTEM PROVIDED HISTORY: PNA TECHNOLOGIST PROVIDED HISTORY: PNA Ordering Physician Provided Reason for Exam: Follow-up pneumonia. Acuity: Unknown Type of Exam: Unknown Additional signs and symptoms: Follow-up pneumonia. FINDINGS: Cardiomegaly with interstitial vascular congestion is noted. No focal lung consolidation, pneumothorax, pleural effusion or free air. 1. No significant change in chest findings compared to the prior study. Ct Chest Pulmonary Embolism W Contrast    Result Date: 9/19/2018  EXAMINATION: CTA OF THE CHEST 9/19/2018 8:40 am TECHNIQUE: CTA of the chest was performed after the administration of intravenous contrast.  Multiplanar reformatted images are provided for review. MIP images are provided for review. Dose modulation, iterative reconstruction, and/or weight based adjustment of the mA/kV was utilized to reduce the radiation dose to as low as reasonably achievable. COMPARISON: None. HISTORY: ORDERING SYSTEM PROVIDED HISTORY: pulmonary embolism FINDINGS: Pulmonary Arteries: Pulmonary arteries are adequately opacified for evaluation. Motion artifact degrades evaluation of the segmental branches in the lower lobes. No evidence of intraluminal filling defect to suggest pulmonary embolism. Main pulmonary artery is normal in caliber. Mediastinum: Mildly prominent right paraesophageal lymphadenopathy is noted in the superior mediastinum measuring up to 8 mm in short axis on image 104.  Right hilar lymphatic tissue is also prominent measuring 12 mm on image 63. Mild nonspecific esophageal wall thickening is noted and appears relatively diffuse. Moderate size hiatal hernia is noted. The heart and pericardium demonstrate no acute abnormality. There is no acute abnormality of the thoracic aorta. Calcified atheromatous plaque and coronary calcifications are noted. Lungs/pleura: Respiratory motion artifact degrades fine parenchymal detail, notably in the lung bases. Moderately severe emphysema is noted. Opacities in the medial right upper lobe and medial lingula are noted as well as a peripheral opacity in the inferior right middle lobe. Findings of subsegmental atelectasis in the lung bases are noted. No effusion. No pneumothorax. The airway appears patent. Mild peribronchial wall thickening is noted. Upper Abdomen: No acute findings. Soft Tissues/Bones: No acute bone or soft tissue abnormality. Motion artifact degrades this examination, however there is no evidence for acute pulmonary embolism. Bilateral airspace disease, suggestive of an inflammatory process or infection. Mild central airway congestion is also noted. Given the background of emphysema, short-term noncontrast CT follow-up in 3 months is recommended to ensure resolution. Mildly prominent mediastinal and hilar lymph nodes. These may be reactive given the airspace disease. Attention to on follow-up is recommended. Mild relatively diffuse esophageal wall thickening. Question esophagitis. Moderate size hiatal hernia. Emphysema. IMPRESSION:   1. Abdominal pain nausea vomiting change in bowel habits  2. Evidence of moderate size hiatal hernia with possible aspiration  3. Aspiration pneumonia  4. No previous abdominal surgeries    has Bright red blood per rectum on his pertinent problem list.    PLAN:   1. CT scan of the abdomen and pelvis with oral and IV contrast  2. Nothing by mouth after midnight  3. Hold Lovenox  4.  EGD in the morning if okay with admitting

## 2018-09-23 NOTE — PLAN OF CARE
Problem: Pain:  Goal: Pain level will decrease  Pain level will decrease   Outcome: Ongoing  Pt medicated with pain medication prn. Assessed all pain characteristics including level, type, location, frequency, and onset. Non-pharmacologic interventions offered to pt as well. Pt states pain is tolerable at this time. Will continue to monitor      Problem: Breathing Pattern - Ineffective:  Goal: Ability to achieve and maintain a regular respiratory rate will improve  Ability to achieve and maintain a regular respiratory rate will improve   Outcome: Ongoing  Patients respiratory status stable at this time. No signs or symptoms of distress noted. Respirations non-labored and regular. Nasal canula 02 in place as needed to maintain sp02>90% or per md order. Continuous SpO2 monitoring in place. Problem: Skin Integrity:  Goal: Will show no infection signs and symptoms  Will show no infection signs and symptoms   Outcome: Ongoing  Skin assessment complete. Pt turned and repositioned per self. Area kept free from moisture. Proper nourishment and fluids encouraged, as appropriate. Skin remains clean, dry, and intact. Will continue to monitor for additional needs and changes in skin breakdown.

## 2018-09-24 ENCOUNTER — ANESTHESIA EVENT (OUTPATIENT)
Dept: OPERATING ROOM | Age: 68
DRG: 179 | End: 2018-09-24
Payer: MEDICARE

## 2018-09-24 ENCOUNTER — ANESTHESIA (OUTPATIENT)
Dept: OPERATING ROOM | Age: 68
DRG: 179 | End: 2018-09-24
Payer: MEDICARE

## 2018-09-24 VITALS — SYSTOLIC BLOOD PRESSURE: 111 MMHG | OXYGEN SATURATION: 93 % | DIASTOLIC BLOOD PRESSURE: 57 MMHG

## 2018-09-24 LAB
ABSOLUTE EOS #: 0.39 K/UL (ref 0–0.4)
ABSOLUTE IMMATURE GRANULOCYTE: ABNORMAL K/UL (ref 0–0.3)
ABSOLUTE LYMPH #: 1.05 K/UL (ref 1–4.8)
ABSOLUTE MONO #: 1.18 K/UL (ref 0.1–1.3)
ANION GAP SERPL CALCULATED.3IONS-SCNC: 11 MMOL/L (ref 9–17)
BASOPHILS # BLD: 0 % (ref 0–2)
BASOPHILS ABSOLUTE: 0 K/UL (ref 0–0.2)
BUN BLDV-MCNC: 4 MG/DL (ref 8–23)
BUN/CREAT BLD: ABNORMAL (ref 9–20)
CALCIUM SERPL-MCNC: 8.7 MG/DL (ref 8.6–10.4)
CHLORIDE BLD-SCNC: 104 MMOL/L (ref 98–107)
CO2: 22 MMOL/L (ref 20–31)
CREAT SERPL-MCNC: 0.54 MG/DL (ref 0.7–1.2)
DIFFERENTIAL TYPE: ABNORMAL
EOSINOPHILS RELATIVE PERCENT: 3 % (ref 0–4)
GFR AFRICAN AMERICAN: >60 ML/MIN
GFR NON-AFRICAN AMERICAN: >60 ML/MIN
GFR SERPL CREATININE-BSD FRML MDRD: ABNORMAL ML/MIN/{1.73_M2}
GFR SERPL CREATININE-BSD FRML MDRD: ABNORMAL ML/MIN/{1.73_M2}
GLUCOSE BLD-MCNC: 97 MG/DL (ref 70–99)
HCT VFR BLD CALC: 40.4 % (ref 41–53)
HEMOGLOBIN: 13.3 G/DL (ref 13.5–17.5)
IMMATURE GRANULOCYTES: ABNORMAL %
LYMPHOCYTES # BLD: 8 % (ref 24–44)
MCH RBC QN AUTO: 28 PG (ref 26–34)
MCHC RBC AUTO-ENTMCNC: 32.8 G/DL (ref 31–37)
MCV RBC AUTO: 85.5 FL (ref 80–100)
MONOCYTES # BLD: 9 % (ref 1–7)
MORPHOLOGY: NORMAL
NRBC AUTOMATED: ABNORMAL PER 100 WBC
PDW BLD-RTO: 14.4 % (ref 11.5–14.9)
PLATELET # BLD: 406 K/UL (ref 150–450)
PLATELET ESTIMATE: ABNORMAL
PMV BLD AUTO: 7.6 FL (ref 6–12)
POTASSIUM SERPL-SCNC: 4.2 MMOL/L (ref 3.7–5.3)
RBC # BLD: 4.73 M/UL (ref 4.5–5.9)
RBC # BLD: ABNORMAL 10*6/UL
SEG NEUTROPHILS: 80 % (ref 36–66)
SEGMENTED NEUTROPHILS ABSOLUTE COUNT: 10.48 K/UL (ref 1.3–9.1)
SODIUM BLD-SCNC: 137 MMOL/L (ref 135–144)
WBC # BLD: 13.1 K/UL (ref 3.5–11)
WBC # BLD: ABNORMAL 10*3/UL

## 2018-09-24 PROCEDURE — 2700000000 HC OXYGEN THERAPY PER DAY

## 2018-09-24 PROCEDURE — 2500000003 HC RX 250 WO HCPCS: Performed by: NURSE ANESTHETIST, CERTIFIED REGISTERED

## 2018-09-24 PROCEDURE — C9113 INJ PANTOPRAZOLE SODIUM, VIA: HCPCS | Performed by: SURGERY

## 2018-09-24 PROCEDURE — 3609012400 HC EGD TRANSORAL BIOPSY SINGLE/MULTIPLE: Performed by: SURGERY

## 2018-09-24 PROCEDURE — 2580000003 HC RX 258: Performed by: NURSE PRACTITIONER

## 2018-09-24 PROCEDURE — 2580000003 HC RX 258: Performed by: SURGERY

## 2018-09-24 PROCEDURE — 6370000000 HC RX 637 (ALT 250 FOR IP): Performed by: INTERNAL MEDICINE

## 2018-09-24 PROCEDURE — 36415 COLL VENOUS BLD VENIPUNCTURE: CPT

## 2018-09-24 PROCEDURE — 94761 N-INVAS EAR/PLS OXIMETRY MLT: CPT

## 2018-09-24 PROCEDURE — 88305 TISSUE EXAM BY PATHOLOGIST: CPT

## 2018-09-24 PROCEDURE — 6360000002 HC RX W HCPCS: Performed by: SURGERY

## 2018-09-24 PROCEDURE — 6370000000 HC RX 637 (ALT 250 FOR IP): Performed by: SURGERY

## 2018-09-24 PROCEDURE — 7100000001 HC PACU RECOVERY - ADDTL 15 MIN: Performed by: SURGERY

## 2018-09-24 PROCEDURE — 6360000002 HC RX W HCPCS: Performed by: NURSE PRACTITIONER

## 2018-09-24 PROCEDURE — 6360000002 HC RX W HCPCS: Performed by: NURSE ANESTHETIST, CERTIFIED REGISTERED

## 2018-09-24 PROCEDURE — 3700000001 HC ADD 15 MINUTES (ANESTHESIA): Performed by: SURGERY

## 2018-09-24 PROCEDURE — 2580000003 HC RX 258: Performed by: STUDENT IN AN ORGANIZED HEALTH CARE EDUCATION/TRAINING PROGRAM

## 2018-09-24 PROCEDURE — 2709999900 HC NON-CHARGEABLE SUPPLY: Performed by: SURGERY

## 2018-09-24 PROCEDURE — 80048 BASIC METABOLIC PNL TOTAL CA: CPT

## 2018-09-24 PROCEDURE — 6360000002 HC RX W HCPCS: Performed by: STUDENT IN AN ORGANIZED HEALTH CARE EDUCATION/TRAINING PROGRAM

## 2018-09-24 PROCEDURE — 3700000000 HC ANESTHESIA ATTENDED CARE: Performed by: SURGERY

## 2018-09-24 PROCEDURE — 85025 COMPLETE CBC W/AUTO DIFF WBC: CPT

## 2018-09-24 PROCEDURE — 99233 SBSQ HOSP IP/OBS HIGH 50: CPT | Performed by: INTERNAL MEDICINE

## 2018-09-24 PROCEDURE — 7100000000 HC PACU RECOVERY - FIRST 15 MIN: Performed by: SURGERY

## 2018-09-24 PROCEDURE — 0DB48ZX EXCISION OF ESOPHAGOGASTRIC JUNCTION, VIA NATURAL OR ARTIFICIAL OPENING ENDOSCOPIC, DIAGNOSTIC: ICD-10-PCS | Performed by: SURGERY

## 2018-09-24 PROCEDURE — 2060000000 HC ICU INTERMEDIATE R&B

## 2018-09-24 PROCEDURE — 6370000000 HC RX 637 (ALT 250 FOR IP): Performed by: NURSE PRACTITIONER

## 2018-09-24 PROCEDURE — 97116 GAIT TRAINING THERAPY: CPT

## 2018-09-24 PROCEDURE — 94640 AIRWAY INHALATION TREATMENT: CPT

## 2018-09-24 PROCEDURE — 0DB68ZX EXCISION OF STOMACH, VIA NATURAL OR ARTIFICIAL OPENING ENDOSCOPIC, DIAGNOSTIC: ICD-10-PCS | Performed by: SURGERY

## 2018-09-24 RX ORDER — ESOMEPRAZOLE MAGNESIUM 40 MG/1
40 CAPSULE, DELAYED RELEASE ORAL 2 TIMES DAILY
Qty: 120 CAPSULE | Refills: 1 | Status: SHIPPED | OUTPATIENT
Start: 2018-09-24 | End: 2018-11-23

## 2018-09-24 RX ORDER — MIDAZOLAM HYDROCHLORIDE 1 MG/ML
INJECTION INTRAMUSCULAR; INTRAVENOUS PRN
Status: DISCONTINUED | OUTPATIENT
Start: 2018-09-24 | End: 2018-09-24 | Stop reason: SDUPTHER

## 2018-09-24 RX ORDER — SUCRALFATE 1 G/1
1 TABLET ORAL 4 TIMES DAILY
Qty: 240 TABLET | Refills: 1 | Status: SHIPPED | OUTPATIENT
Start: 2018-09-24 | End: 2018-11-23

## 2018-09-24 RX ORDER — 0.9 % SODIUM CHLORIDE 0.9 %
10 VIAL (ML) INJECTION 2 TIMES DAILY
Status: DISCONTINUED | OUTPATIENT
Start: 2018-09-24 | End: 2018-09-25 | Stop reason: HOSPADM

## 2018-09-24 RX ORDER — 0.9 % SODIUM CHLORIDE 0.9 %
250 INTRAVENOUS SOLUTION INTRAVENOUS
Status: DISCONTINUED | OUTPATIENT
Start: 2018-09-24 | End: 2018-09-24 | Stop reason: HOSPADM

## 2018-09-24 RX ORDER — SUCRALFATE 1 G/1
1 TABLET ORAL EVERY 6 HOURS SCHEDULED
Status: DISCONTINUED | OUTPATIENT
Start: 2018-09-24 | End: 2018-09-25 | Stop reason: HOSPADM

## 2018-09-24 RX ORDER — PROPOFOL 10 MG/ML
INJECTION, EMULSION INTRAVENOUS CONTINUOUS PRN
Status: DISCONTINUED | OUTPATIENT
Start: 2018-09-24 | End: 2018-09-24 | Stop reason: SDUPTHER

## 2018-09-24 RX ORDER — KETAMINE HYDROCHLORIDE 50 MG/ML
INJECTION, SOLUTION, CONCENTRATE INTRAMUSCULAR; INTRAVENOUS PRN
Status: DISCONTINUED | OUTPATIENT
Start: 2018-09-24 | End: 2018-09-24 | Stop reason: SDUPTHER

## 2018-09-24 RX ORDER — PANTOPRAZOLE SODIUM 40 MG/10ML
40 INJECTION, POWDER, LYOPHILIZED, FOR SOLUTION INTRAVENOUS 2 TIMES DAILY
Status: DISCONTINUED | OUTPATIENT
Start: 2018-09-24 | End: 2018-09-25 | Stop reason: HOSPADM

## 2018-09-24 RX ADMIN — FERROUS SULFATE TAB 325 MG (65 MG ELEMENTAL FE) 325 MG: 325 (65 FE) TAB at 18:08

## 2018-09-24 RX ADMIN — PANTOPRAZOLE SODIUM 40 MG: 40 INJECTION, POWDER, FOR SOLUTION INTRAVENOUS at 20:54

## 2018-09-24 RX ADMIN — SUCRALFATE 1 G: 1 TABLET ORAL at 18:08

## 2018-09-24 RX ADMIN — ONDANSETRON 4 MG: 2 INJECTION INTRAMUSCULAR; INTRAVENOUS at 02:10

## 2018-09-24 RX ADMIN — Medication 10 ML: at 14:33

## 2018-09-24 RX ADMIN — PANTOPRAZOLE SODIUM 40 MG: 40 INJECTION, POWDER, FOR SOLUTION INTRAVENOUS at 14:33

## 2018-09-24 RX ADMIN — IPRATROPIUM BROMIDE AND ALBUTEROL SULFATE 1 AMPULE: .5; 3 SOLUTION RESPIRATORY (INHALATION) at 07:01

## 2018-09-24 RX ADMIN — PROPOFOL 100 MCG/KG/MIN: 10 INJECTION, EMULSION INTRAVENOUS at 09:59

## 2018-09-24 RX ADMIN — PIPERACILLIN SODIUM AND TAZOBACTAM SODIUM 3.38 G: 3; .375 INJECTION, POWDER, LYOPHILIZED, FOR SOLUTION INTRAVENOUS at 02:07

## 2018-09-24 RX ADMIN — MIDAZOLAM 2 MG: 1 INJECTION INTRAMUSCULAR; INTRAVENOUS at 09:59

## 2018-09-24 RX ADMIN — Medication 10 ML: at 20:55

## 2018-09-24 RX ADMIN — SODIUM CHLORIDE: 9 INJECTION, SOLUTION INTRAVENOUS at 09:53

## 2018-09-24 RX ADMIN — PIPERACILLIN SODIUM AND TAZOBACTAM SODIUM 3.38 G: 3; .375 INJECTION, POWDER, LYOPHILIZED, FOR SOLUTION INTRAVENOUS at 14:48

## 2018-09-24 RX ADMIN — SODIUM CHLORIDE: 9 INJECTION, SOLUTION INTRAVENOUS at 02:11

## 2018-09-24 RX ADMIN — LIDOCAINE HYDROCHLORIDE 15 ML: 20 SOLUTION ORAL; TOPICAL at 09:50

## 2018-09-24 RX ADMIN — PIPERACILLIN SODIUM AND TAZOBACTAM SODIUM 3.38 G: 3; .375 INJECTION, POWDER, LYOPHILIZED, FOR SOLUTION INTRAVENOUS at 08:13

## 2018-09-24 RX ADMIN — SODIUM CHLORIDE 10 ML: 9 INJECTION INTRAMUSCULAR; INTRAVENOUS; SUBCUTANEOUS at 20:56

## 2018-09-24 RX ADMIN — IPRATROPIUM BROMIDE AND ALBUTEROL SULFATE 1 AMPULE: .5; 3 SOLUTION RESPIRATORY (INHALATION) at 12:15

## 2018-09-24 RX ADMIN — SUCRALFATE 1 G: 1 TABLET ORAL at 14:00

## 2018-09-24 RX ADMIN — Medication 10 ML: at 20:54

## 2018-09-24 RX ADMIN — IPRATROPIUM BROMIDE AND ALBUTEROL SULFATE 1 AMPULE: .5; 3 SOLUTION RESPIRATORY (INHALATION) at 16:12

## 2018-09-24 RX ADMIN — PIPERACILLIN SODIUM AND TAZOBACTAM SODIUM 3.38 G: 3; .375 INJECTION, POWDER, LYOPHILIZED, FOR SOLUTION INTRAVENOUS at 20:53

## 2018-09-24 RX ADMIN — GUAIFENESIN 600 MG: 600 TABLET, EXTENDED RELEASE ORAL at 20:53

## 2018-09-24 RX ADMIN — IPRATROPIUM BROMIDE AND ALBUTEROL SULFATE 1 AMPULE: .5; 3 SOLUTION RESPIRATORY (INHALATION) at 19:16

## 2018-09-24 RX ADMIN — KETAMINE HYDROCHLORIDE 30 MG: 50 INJECTION, SOLUTION INTRAMUSCULAR; INTRAVENOUS at 09:59

## 2018-09-24 ASSESSMENT — PULMONARY FUNCTION TESTS
PIF_VALUE: 0
PIF_VALUE: 2
PIF_VALUE: 0
PIF_VALUE: 1
PIF_VALUE: 2
PIF_VALUE: 0

## 2018-09-24 ASSESSMENT — PAIN SCALES - GENERAL
PAINLEVEL_OUTOF10: 0
PAINLEVEL_OUTOF10: 0

## 2018-09-24 ASSESSMENT — PAIN DESCRIPTION - DESCRIPTORS: DESCRIPTORS: ACHING;CRAMPING

## 2018-09-24 ASSESSMENT — LIFESTYLE VARIABLES: SMOKING_STATUS: 1

## 2018-09-24 ASSESSMENT — PAIN DESCRIPTION - PAIN TYPE: TYPE: ACUTE PAIN

## 2018-09-24 ASSESSMENT — PAIN - FUNCTIONAL ASSESSMENT: PAIN_FUNCTIONAL_ASSESSMENT: 0-10

## 2018-09-24 NOTE — ANESTHESIA PRE PROCEDURE
CNP   600 mg at 09/23/18 2020    [MAR Hold] benzonatate (TESSALON) capsule 200 mg  200 mg Oral TID PRN JOE Cedeno CNP   200 mg at 09/23/18 2317    [MAR Hold] levofloxacin (LEVAQUIN) 500 MG/100ML infusion 500 mg  500 mg Intravenous Q24H Mica eKrn MD   Stopped at 09/23/18 1341    [MAR Hold] ipratropium-albuterol (DUONEB) nebulizer solution 1 ampule  1 ampule Inhalation Q4H WA Mica Kern MD   1 ampule at 09/24/18 0701    [MAR Hold] potassium chloride (KLOR-CON M) extended release tablet 40 mEq  40 mEq Oral PRN Mica Kern MD        Or   [facility] Hold] potassium chloride 20 MEQ/15ML (10%) oral solution 40 mEq  40 mEq Oral PRN Mica Kern MD        Or   Esequiel Goltz [facility] Hold] potassium chloride 10 mEq/100 mL IVPB (Peripheral Line)  10 mEq Intravenous PRN Mica Kern MD        [facility] Hold] piperacillin-tazobactam (ZOSYN) 3.375 g in dextrose 5 % 50 mL IVPB (mini-bag)  3.375 g Intravenous Q6H JOE Cedeno  mL/hr at 09/24/18 0813 3.375 g at 09/24/18 0813    [MAR Hold] albuterol sulfate  (90 Base) MCG/ACT inhaler 2 puff  2 puff Inhalation Q6H PRN Adelso Colunga MD        [MAR Hold] hydrocortisone (ANUSOL-HC) 2.5 % rectal cream   Rectal TID PRN Adelso Colunga MD        [facility] Hold] pantoprazole (PROTONIX) tablet 40 mg  40 mg Oral QAM AC Merlin Dillon MD   40 mg at 09/23/18 0613    [MAR Hold] tamsulosin (FLOMAX) capsule 0.4 mg  0.4 mg Oral Daily Merlin Dillon MD   0.4 mg at 09/23/18 0830    [MAR Hold] sodium chloride flush 0.9 % injection 10 mL  10 mL Intravenous 2 times per day Adelso Colunga MD   10 mL at 09/20/18 1935    [MAR Hold] sodium chloride flush 0.9 % injection 10 mL  10 mL Intravenous PRN Adelso Colunga MD        [MAR Hold] magnesium hydroxide (MILK OF MAGNESIA) 400 MG/5ML suspension 30 mL  30 mL Oral Daily PRN Adelso Colunga MD        [MAR Hold] ondansetron (ZOFRAN) injection 4 mg  4 mg Intravenous Q6H PRN Adelso Colunga MD   4 mg  TONSILLECTOMY         Social History:    Social History   Substance Use Topics    Smoking status: Current Every Day Smoker     Packs/day: 1.50    Smokeless tobacco: Not on file    Alcohol use No                                Ready to quit: Not Answered  Counseling given: Not Answered      Vital Signs (Current):   Vitals:    09/24/18 0015 09/24/18 0643 09/24/18 0703 09/24/18 0937   BP: 132/69 (!) 148/73  123/73   Pulse: 92 90  86   Resp: 18 20 18   Temp: 98.4 °F (36.9 °C) 97.4 °F (36.3 °C)  97.3 °F (36.3 °C)   TempSrc: Oral Oral  Oral   SpO2: 94% 92% 92% 93%   Weight:       Height:                                                  BP Readings from Last 3 Encounters:   09/24/18 123/73   09/19/18 116/62   01/05/16 144/80       NPO Status: Time of last liquid consumption: 1800                        Time of last solid consumption: 1800                        Date of last liquid consumption: 09/23/18                        Date of last solid food consumption: 09/23/18    BMI:   Wt Readings from Last 3 Encounters:   09/21/18 154 lb 8 oz (70.1 kg)   09/19/18 170 lb (77.1 kg)   01/05/16 176 lb 5.9 oz (80 kg)     Body mass index is 23.49 kg/m². CBC:   Lab Results   Component Value Date    WBC 13.1 09/24/2018    RBC 4.73 09/24/2018    HGB 13.3 09/24/2018    HCT 40.4 09/24/2018    MCV 85.5 09/24/2018    RDW 14.4 09/24/2018     09/24/2018       CMP:   Lab Results   Component Value Date     09/24/2018    K 4.2 09/24/2018     09/24/2018    CO2 22 09/24/2018    BUN 4 09/24/2018    CREATININE 0.54 09/24/2018    GFRAA >60 09/24/2018    LABGLOM >60 09/24/2018    GLUCOSE 97 09/24/2018    PROT 7.4 09/19/2018    CALCIUM 8.7 09/24/2018    BILITOT 0.94 09/19/2018    ALKPHOS 233 09/19/2018    AST 62 09/19/2018    ALT 66 09/19/2018       POC Tests: No results for input(s): POCGLU, POCNA, POCK, POCCL, POCBUN, POCHEMO, POCHCT in the last 72 hours.     Coags:   Lab Results   Component Value Date    PROTIME 11.7 09/19/2018    INR 1.1 09/19/2018    APTT 26.6 09/19/2018       HCG (If Applicable): No results found for: PREGTESTUR, PREGSERUM, HCG, HCGQUANT     ABGs: No results found for: PHART, PO2ART, WID3ONG, QNL7QJY, BEART, H5VNMNSY     Type & Screen (If Applicable):  No results found for: LABABO, [address]    Anesthesia Evaluation  Patient summary reviewed and Nursing notes reviewed no history of anesthetic complications:   Airway: Mallampati: III  TM distance: >3 FB   Neck ROM: limited  Mouth opening: > = 3 FB Dental:    (+) edentulous      Pulmonary:   (+) pneumonia:  COPD:  decreased breath sounds,  current smoker          Patient did not smoke on day of surgery. Cardiovascular:    (+) past MI:, [place]:,                   Neuro/Psych:               GI/Hepatic/Renal:   (+) GERD:,           Endo/Other: Negative Endo/Other ROS                    Abdominal:           Vascular:                                        Anesthesia Plan      MAC     ASA 3       Induction: intravenous. Anesthetic plan and risks discussed with patient. Plan discussed with CRNA.                   Charleen Ormond, MD   9/24/2018

## 2018-09-24 NOTE — PROGRESS NOTES
[address]   Physical Therapy Progress Note    Date: 18  Patient Name: Rebecca Reyes       Room:   MRN: [id number]   Account: [de-identified]   : 1950  (79 y.o.)   Gender: male     Discharge Recommendations   ECF with PT  Equipment Needed: Yes  Mobility Devices: Mima Riggnis: Rolling    Referring Practitioner: Dr. Antonio Alves  Diagnosis: Abdominal pain and pneumonia  Restrictions/Precautions: Fall Risk  Implants present? : Metal implants   Past Medical History:  has a past medical history of Acid reflux; Anemia; Colon polyps; COPD (chronic obstructive pulmonary disease) ([facility] [address] and Rectal prolapse. Past Surgical History:   has a past surgical history that includes Tonsillectomy; eye surgery; Colonoscopy (2015); Colonoscopy (2016); and Upper gastrointestinal endoscopy (N/A, 2018). Additional Pertinent Hx: Pt admitted for complaints of abdominal pain, nausea, and vomiting, CT chest shows pnemonia. Overall Orientation Status: Within Normal Limits  Restrictions/Precautions  Restrictions/Precautions: Fall Risk  Required Braces or Orthoses?: No  Implants present? : Metal implants    Subjective: Pt reports still a little nauseas; pt states he is feeling better after medicine  Comments: Pt was feeling very sick on first attempt. RN Ashok Fajardo. gave pt medication for nausea. Returned for 2nd attempt and pt was more alert and able to participate.     Vital Signs  Patient Currently in Pain: No                   Bed Mobility:   Bed Mobility  Rolling: Modified independent (used bedrail)  Supine to Sit: Modified independent (HOB elevated to 40 degrees)  Sit to Supine: Unable to assess (left pt in bedside chair)  Scooting: Independent (to EOB)  Bed mobility  Scooting: Independent (to EOB)    Transfers:  Sit to Stand: Contact guard assistance (very unsteady upon standing)  Stand to sit: Stand by assistance  Bed to Chair: Contact guard assistance (very unsteady) Ambulation 1  Surface: level tile  Device: Rolling Walker  Assistance: Minimal assistance;Contact guard assistance (Started as CGA with increased unsteadiness/fatigue Min Ax1)  Quality of Gait: very unsteady, pt reports \"my equilibrium is off\", multiple small LOB, stands outside of RW, very unsteady  Distance: 120ft  Comments: fatigues quickly, unsafe when fatigues sets in, max vc's for sequencing with RW                                                              Posture: Fair  Sitting - Static: Good (Seated EOB )  Sitting - Dynamic: Good (Seated EOB)  Standing - Static: Fair (Standing with RW)  Standing - Dynamic: Fair;- (Standing with RW)  Comments: Pt had EGD with biopsy prior to treatment     Other exercises 3: Education, Demonstration & safety on use of RW  Other exercises 4: Sit to Stand x3           Activity Tolerance: Patient limited by endurance; Patient limited by fatigue;Treatment limited secondary to medical complications (free text); Other (Pt had EGD prior to treatment)  Activity Tolerance: Pt may be unsteady due to earlier EGD  PT Equipment Recommendations  Equipment Needed: Yes  Mobility Devices: Donnamaria Persons: Rolling       Assessment  Activity Tolerance: Patient limited by endurance; Patient limited by fatigue;Treatment limited secondary to medical complications (free text); Other (Pt had EGD prior to treatment)   Body structures, Functions, Activity limitations: Decreased functional mobility ; Decreased endurance;Decreased balance;Decreased strength  Prognosis: Good  Discharge Recommendations: ECF with PT     Type of devices: Call light within reach; Left in chair;Nurse notified;Gait belt  Restraints  Initially in place: No     Plan  Times per week: 5 to 6 x/wk  Times per day: Daily  Current Treatment Recommendations: Strengthening, Balance Training, Functional Mobility Training, Transfer Training, Endurance Training, Gait Training, Safety Education & Training, Patient/Caregiver Education & Training    Patient Education  New Education Provided: Safe use of RW/Importance of mobility  Learner:patient  Method: demonstration and explanation       Outcome: needs reinforcement     Goals  Short term goals  Time Frame for Short term goals: 3 visits   Short term goal 1: Pt able to perfrom bed mobility/transfers at mod-I  Short term goal 2:  Pt able to ambulate with st can dist of 70 ft x 2, SBA with least amount of supplemental o2. Short term goal 3: Pt able to tolerate activity for 30 minutes to improve endurance/strengh.     PT Individual Minutes  Time In: 2942  Time Out: 5081  Minutes: 24    Electronically signed by Rene Stone PTA on 9/24/18 at 4:02 PM

## 2018-09-24 NOTE — PLAN OF CARE
Problem: Pain:  Goal: Pain level will decrease  Pain level will decrease   Outcome: Met This Shift  No complaints of pain this shift     Problem: Breathing Pattern - Ineffective:  Goal: Ability to achieve and maintain a regular respiratory rate will improve  Ability to achieve and maintain a regular respiratory rate will improve   Outcome: Met This Shift  Patient has labored respirations at times     Problem: Skin Integrity:  Goal: Will show no infection signs and symptoms  Will show no infection signs and symptoms   Outcome: Met This Shift  No new skin breakdown noted this shift, patient turns self in bed  Up to chair with PT

## 2018-09-24 NOTE — OP NOTE
conscious sedation without unusual events. In the recovery room patient was examined and remains hemodynamically stable. Discharge home when criteria met. Recommendations/Plan:   1. F/U Biopsies  2. F/U In Office as instructed  3. Discussed with the family  4. Resume diet  5. High dose proton pump inhibitors/Carafate  6. Medical management  7. Outpatient follow-up in the office to discuss further treatment plan/options  8. Repeat EGD in 8-10 weeks  9. Given his history of chronic GERD dysphagia aspiration pneumonia patient may benefit from surgical intervention for this hiatal hernia causing chronic GERD likely Ege's esophagus and other symptoms. 10. Quit smoking  11.  Lifestyle alterations diet modifications    Electronically signed by Charli Oglesby MD  on 9/24/2018 at 10:19 AM

## 2018-09-24 NOTE — ANESTHESIA POSTPROCEDURE EVALUATION
Department of Anesthesiology  Postprocedure Note    Patient: Michael Barker  MRN: [id number]  YOB: 1950  Date of evaluation: 9/24/2018  Time:  11:31 AM     Procedure Summary     Date:  09/24/18 Room / Location:  [address] / [address]    Anesthesia Start:  9212 Anesthesia Stop:  1024    Procedure:  EGD BIOPSY AND PHOTOS (N/A Esophagus) Diagnosis:  (ABDOMINAL PAIN )    Surgeon:  Jh Donovan MD Responsible Provider:      Anesthesia Type:  MAC ASA Status:  3          Anesthesia Type: MAC    Abhijit Phase I: Abhijit Score: 9    Abhijit Phase II:      Last vitals: Reviewed and per EMR flowsheets.        Anesthesia Post Evaluation    Comments: POST- ANESTHESIA EVALUATION       Pt Name: Michael Barker  MRN: [id number]  YOB: 1950  Date of evaluation: 9/24/2018  Time:  11:31 AM      /67   Pulse 94   Temp 98.1 °F (36.7 °C)   Resp 18   Ht 5' 8\" (1.727 m)   Wt 154 lb 8 oz (70.1 kg)   SpO2 96%   BMI 23.49 kg/m²      Consciousness Level  Awake  Cardiopulmonary Status  Stable  Pain Adequately Treated YES  Nausea / Vomiting  NO  Adequate Hydration  YES  Anesthesia Related Complications NONE      Electronically signed by Charleen Ormond, MD on 9/24/2018 at 11:31 AM

## 2018-09-25 VITALS
HEART RATE: 85 BPM | DIASTOLIC BLOOD PRESSURE: 63 MMHG | HEIGHT: 68 IN | WEIGHT: 159.61 LBS | BODY MASS INDEX: 24.19 KG/M2 | RESPIRATION RATE: 18 BRPM | SYSTOLIC BLOOD PRESSURE: 123 MMHG | TEMPERATURE: 96.4 F | OXYGEN SATURATION: 94 %

## 2018-09-25 LAB
ABSOLUTE EOS #: 0.2 K/UL (ref 0–0.4)
ABSOLUTE IMMATURE GRANULOCYTE: ABNORMAL K/UL (ref 0–0.3)
ABSOLUTE LYMPH #: 1.5 K/UL (ref 1–4.8)
ABSOLUTE MONO #: 1 K/UL (ref 0.1–1.3)
ANION GAP SERPL CALCULATED.3IONS-SCNC: 11 MMOL/L (ref 9–17)
BASOPHILS # BLD: 1 % (ref 0–2)
BASOPHILS ABSOLUTE: 0.1 K/UL (ref 0–0.2)
BUN BLDV-MCNC: 5 MG/DL (ref 8–23)
BUN/CREAT BLD: ABNORMAL (ref 9–20)
CALCIUM SERPL-MCNC: 8.2 MG/DL (ref 8.6–10.4)
CHLORIDE BLD-SCNC: 104 MMOL/L (ref 98–107)
CO2: 23 MMOL/L (ref 20–31)
CREAT SERPL-MCNC: 0.52 MG/DL (ref 0.7–1.2)
CULTURE: NORMAL
CULTURE: NORMAL
DIFFERENTIAL TYPE: ABNORMAL
EOSINOPHILS RELATIVE PERCENT: 2 % (ref 0–4)
GFR AFRICAN AMERICAN: >60 ML/MIN
GFR NON-AFRICAN AMERICAN: >60 ML/MIN
GFR SERPL CREATININE-BSD FRML MDRD: ABNORMAL ML/MIN/{1.73_M2}
GFR SERPL CREATININE-BSD FRML MDRD: ABNORMAL ML/MIN/{1.73_M2}
GLUCOSE BLD-MCNC: 151 MG/DL (ref 70–99)
HCT VFR BLD CALC: 37.7 % (ref 41–53)
HEMOGLOBIN: 12.4 G/DL (ref 13.5–17.5)
IMMATURE GRANULOCYTES: ABNORMAL %
LYMPHOCYTES # BLD: 15 % (ref 24–44)
Lab: NORMAL
Lab: NORMAL
MCH RBC QN AUTO: 28 PG (ref 26–34)
MCHC RBC AUTO-ENTMCNC: 32.8 G/DL (ref 31–37)
MCV RBC AUTO: 85.4 FL (ref 80–100)
MONOCYTES # BLD: 9 % (ref 1–7)
NRBC AUTOMATED: ABNORMAL PER 100 WBC
PDW BLD-RTO: 14.3 % (ref 11.5–14.9)
PLATELET # BLD: 390 K/UL (ref 150–450)
PLATELET ESTIMATE: ABNORMAL
PMV BLD AUTO: 7.6 FL (ref 6–12)
POTASSIUM SERPL-SCNC: 4.2 MMOL/L (ref 3.7–5.3)
RBC # BLD: 4.41 M/UL (ref 4.5–5.9)
RBC # BLD: ABNORMAL 10*6/UL
SEG NEUTROPHILS: 73 % (ref 36–66)
SEGMENTED NEUTROPHILS ABSOLUTE COUNT: 7.5 K/UL (ref 1.3–9.1)
SODIUM BLD-SCNC: 138 MMOL/L (ref 135–144)
SPECIMEN DESCRIPTION: NORMAL
SPECIMEN DESCRIPTION: NORMAL
STATUS: NORMAL
STATUS: NORMAL
SURGICAL PATHOLOGY REPORT: NORMAL
WBC # BLD: 10.4 K/UL (ref 3.5–11)
WBC # BLD: ABNORMAL 10*3/UL

## 2018-09-25 PROCEDURE — 6370000000 HC RX 637 (ALT 250 FOR IP): Performed by: STUDENT IN AN ORGANIZED HEALTH CARE EDUCATION/TRAINING PROGRAM

## 2018-09-25 PROCEDURE — 6370000000 HC RX 637 (ALT 250 FOR IP): Performed by: NURSE PRACTITIONER

## 2018-09-25 PROCEDURE — 6360000002 HC RX W HCPCS: Performed by: NURSE PRACTITIONER

## 2018-09-25 PROCEDURE — 99239 HOSP IP/OBS DSCHRG MGMT >30: CPT | Performed by: INTERNAL MEDICINE

## 2018-09-25 PROCEDURE — 94640 AIRWAY INHALATION TREATMENT: CPT

## 2018-09-25 PROCEDURE — 85025 COMPLETE CBC W/AUTO DIFF WBC: CPT

## 2018-09-25 PROCEDURE — C9113 INJ PANTOPRAZOLE SODIUM, VIA: HCPCS | Performed by: SURGERY

## 2018-09-25 PROCEDURE — 6370000000 HC RX 637 (ALT 250 FOR IP): Performed by: INTERNAL MEDICINE

## 2018-09-25 PROCEDURE — 2580000003 HC RX 258: Performed by: NURSE PRACTITIONER

## 2018-09-25 PROCEDURE — 6370000000 HC RX 637 (ALT 250 FOR IP): Performed by: SURGERY

## 2018-09-25 PROCEDURE — 94761 N-INVAS EAR/PLS OXIMETRY MLT: CPT

## 2018-09-25 PROCEDURE — 2580000003 HC RX 258: Performed by: SURGERY

## 2018-09-25 PROCEDURE — 6360000002 HC RX W HCPCS: Performed by: STUDENT IN AN ORGANIZED HEALTH CARE EDUCATION/TRAINING PROGRAM

## 2018-09-25 PROCEDURE — 2580000003 HC RX 258: Performed by: STUDENT IN AN ORGANIZED HEALTH CARE EDUCATION/TRAINING PROGRAM

## 2018-09-25 PROCEDURE — 6360000002 HC RX W HCPCS: Performed by: SURGERY

## 2018-09-25 PROCEDURE — 80048 BASIC METABOLIC PNL TOTAL CA: CPT

## 2018-09-25 PROCEDURE — 36415 COLL VENOUS BLD VENIPUNCTURE: CPT

## 2018-09-25 PROCEDURE — 6360000002 HC RX W HCPCS: Performed by: INTERNAL MEDICINE

## 2018-09-25 RX ORDER — LEVOFLOXACIN 500 MG/1
500 TABLET, FILM COATED ORAL DAILY
Qty: 5 TABLET | Refills: 0 | Status: SHIPPED | OUTPATIENT
Start: 2018-09-25 | End: 2018-09-30

## 2018-09-25 RX ORDER — BENZONATATE 200 MG/1
200 CAPSULE ORAL 3 TIMES DAILY PRN
Qty: 30 CAPSULE | Refills: 0 | DISCHARGE
Start: 2018-09-25 | End: 2018-10-02

## 2018-09-25 RX ORDER — GUAIFENESIN 600 MG/1
600 TABLET, EXTENDED RELEASE ORAL 2 TIMES DAILY
Qty: 14 TABLET | Refills: 0 | Status: SHIPPED | OUTPATIENT
Start: 2018-09-25

## 2018-09-25 RX ADMIN — IPRATROPIUM BROMIDE AND ALBUTEROL SULFATE 1 AMPULE: .5; 3 SOLUTION RESPIRATORY (INHALATION) at 14:57

## 2018-09-25 RX ADMIN — PIPERACILLIN SODIUM AND TAZOBACTAM SODIUM 3.38 G: 3; .375 INJECTION, POWDER, LYOPHILIZED, FOR SOLUTION INTRAVENOUS at 14:22

## 2018-09-25 RX ADMIN — Medication 10 ML: at 00:31

## 2018-09-25 RX ADMIN — SUCRALFATE 1 G: 1 TABLET ORAL at 05:53

## 2018-09-25 RX ADMIN — SODIUM CHLORIDE 10 ML: 9 INJECTION INTRAMUSCULAR; INTRAVENOUS; SUBCUTANEOUS at 09:10

## 2018-09-25 RX ADMIN — IPRATROPIUM BROMIDE AND ALBUTEROL SULFATE 1 AMPULE: .5; 3 SOLUTION RESPIRATORY (INHALATION) at 10:58

## 2018-09-25 RX ADMIN — PIPERACILLIN SODIUM AND TAZOBACTAM SODIUM 3.38 G: 3; .375 INJECTION, POWDER, LYOPHILIZED, FOR SOLUTION INTRAVENOUS at 03:02

## 2018-09-25 RX ADMIN — TAMSULOSIN HYDROCHLORIDE 0.4 MG: 0.4 CAPSULE ORAL at 09:10

## 2018-09-25 RX ADMIN — PIPERACILLIN SODIUM AND TAZOBACTAM SODIUM 3.38 G: 3; .375 INJECTION, POWDER, LYOPHILIZED, FOR SOLUTION INTRAVENOUS at 09:11

## 2018-09-25 RX ADMIN — SUCRALFATE 1 G: 1 TABLET ORAL at 11:32

## 2018-09-25 RX ADMIN — PANTOPRAZOLE SODIUM 40 MG: 40 INJECTION, POWDER, FOR SOLUTION INTRAVENOUS at 09:10

## 2018-09-25 RX ADMIN — ONDANSETRON 4 MG: 2 INJECTION INTRAMUSCULAR; INTRAVENOUS at 09:12

## 2018-09-25 RX ADMIN — FERROUS SULFATE TAB 325 MG (65 MG ELEMENTAL FE) 325 MG: 325 (65 FE) TAB at 09:09

## 2018-09-25 RX ADMIN — ONDANSETRON 4 MG: 2 INJECTION INTRAMUSCULAR; INTRAVENOUS at 00:31

## 2018-09-25 RX ADMIN — PROMETHAZINE HYDROCHLORIDE 12.5 MG: 25 INJECTION, SOLUTION INTRAMUSCULAR; INTRAVENOUS at 14:22

## 2018-09-25 RX ADMIN — ENOXAPARIN SODIUM 40 MG: 40 INJECTION SUBCUTANEOUS at 09:11

## 2018-09-25 RX ADMIN — SUCRALFATE 1 G: 1 TABLET ORAL at 00:59

## 2018-09-25 RX ADMIN — IPRATROPIUM BROMIDE AND ALBUTEROL SULFATE 1 AMPULE: .5; 3 SOLUTION RESPIRATORY (INHALATION) at 07:12

## 2018-09-25 RX ADMIN — SODIUM CHLORIDE: 9 INJECTION, SOLUTION INTRAVENOUS at 14:25

## 2018-09-25 RX ADMIN — SODIUM CHLORIDE: 9 INJECTION, SOLUTION INTRAVENOUS at 02:33

## 2018-09-25 RX ADMIN — LEVOFLOXACIN 500 MG: 5 INJECTION, SOLUTION INTRAVENOUS at 12:38

## 2018-09-25 RX ADMIN — GUAIFENESIN 600 MG: 600 TABLET, EXTENDED RELEASE ORAL at 09:10

## 2018-09-25 RX ADMIN — ACETAMINOPHEN 650 MG: 325 TABLET, FILM COATED ORAL at 09:34

## 2018-09-25 ASSESSMENT — PAIN SCALES - GENERAL
PAINLEVEL_OUTOF10: 4
PAINLEVEL_OUTOF10: 0

## 2018-09-25 NOTE — PROGRESS NOTES
Report called to Brenda at [facility] or [facility].  All questions answered, patient will be picked up between [id number]

## 2018-09-25 NOTE — PLAN OF CARE
Problem: Pain:  Goal: Pain level will decrease  Pain level will decrease   Outcome: Met This Shift  Pt continues to have stomach discomfort put no pain    Problem: Breathing Pattern - Ineffective:  Goal: Ability to achieve and maintain a regular respiratory rate will improve  Ability to achieve and maintain a regular respiratory rate will improve   Outcome: Met This Shift  Resp.  WNL, O2 levels WNL    Problem: Skin Integrity:  Goal: Absence of new skin breakdown  Absence of new skin breakdown   Outcome: Met This Shift

## 2018-09-25 NOTE — PROGRESS NOTES
Patient was seen and examined. Surgically stable. Patient is getting discharged to [facility] today. Abdomen is benign. Discharge instructions discussed with the charge nurse. Patient needs to take Nexium and Carafate at the facility as directed. Outpatient follow-up in the office in the next 2-3 weeks. Repeat EGD in about 8-10 weeks after treatment. Patient eventually will need surgical intervention to repair the hiatal hernia possible Nissen fundoplication once ulcer is healed.

## 2018-09-25 NOTE — CARE COORDINATION
OSKAR spoke with this patient about therapy recommending rehab for this patient. He reported that he was agreeable. OSKAR offered the patient choice of facility and the patient chose [facility]. OSKAR had the liaison see the patient and OSKAR did fax the referral to central admissions. OSKAR will follow for DC date. ADD:  OSKAR received final DC orders for this patient to admit to [place] [address]. on this date. OSKAR currently awaiting the physician to complete the DC meds. OSKAR will then set up transport. OSKAR did inform the facility that the patient will be admitting to them on this date. OSKAR completed and submitted the 7000 form via [employer]. OSKAR received the final DC orders for this patient to admit to [place] [address]. on this date. OSKAR set up transportation and informed the staff here, the facility, and the patient of the DC/transport time. The patient is scheduled to be picked up at 4:30pm via wheelchair by [id number] [facility]. OSKAR provided the number for report to this patient 's nurse. [place] [phone]).

## 2018-09-25 NOTE — PROGRESS NOTES
been smoking. He has been smoking about 1.50 packs per day. He does not have any smokeless tobacco history on file. He reports that he uses drugs, including Marijuana. He reports that he does not drink alcohol. Family History:   Family History   Problem Relation Age of Onset    Diabetes Brother        Vitals:  /66   Pulse 77   Temp 98.1 °F (36.7 °C) (Oral)   Resp 20   Ht 5' 8\" (1.727 m)   Wt 159 lb 9.8 oz (72.4 kg)   SpO2 (!) 88%   BMI 24.27 kg/m²   Temp (24hrs), Av °F (36.7 °C), Min:97.4 °F (36.3 °C), Max:98.8 °F (37.1 °C)    No results for input(s): POCGLU in the last 72 hours. I/O (24Hr): Intake/Output Summary (Last 24 hours) at 18 1046  Last data filed at 18 0900   Gross per 24 hour   Intake             3465 ml   Output             2150 ml   Net             1315 ml       Labs:    Hematology:  Recent Labs      18   0435  18   0433  18   0424   WBC  12.9*  13.1*  10.4   RBC  4.60  4.73  4.41*   HGB  12.9*  13.3*  12.4*   HCT  39.4*  40.4*  37.7*   MCV  85.7  85.5  85.4   MCH  28.1  28.0  28.0   MCHC  32.8  32.8  32.8   RDW  14.2  14.4  14.3   PLT  366  406  390   MPV  8.2  7.6  7.6     Chemistry:  Recent Labs      18   0435  18   0433  18   0424   NA  136  137  138   K  4.2  4.2  4.2   CL  103  104  104   CO2  23  22  23   GLUCOSE  95  97  151*   BUN  3*  4*  5*   CREATININE  0.51*  0.54*  0.52*   ANIONGAP  10  11  11   LABGLOM  >60  >60  >60   GFRAA  >60  >60  >60   CALCIUM  8.1*  8.7  8.2*     No results for input(s): PROT, LABALBU, LABA1C, B9GEPQK, K7KERWZ, FT4, TSH, AST, ALT, LDH, GGT, ALKPHOS, LABGGT, BILITOT, BILIDIR, AMMONIA, AMYLASE, LIPASE, LACTATE, CHOL, HDL, LDLCHOLESTEROL, CHOLHDLRATIO, TRIG, VLDL, TEW12JZ, PHENYTOIN, PHENYF, URICACID, POCGLU in the last 72 hours.       Lab Results   Component Value Date/Time    SPECIAL NOT REPORTED 2018 02:39 PM     Lab Results   Component Value Date/Time    CULTURE NORMAL RESPIRATORY

## 2018-09-25 NOTE — DISCHARGE SUMMARY
[place] [address] Internal Medicine    Discharge Summary     Patient ID: Tuan Taylor  :  1950   MRN: [id number]     ACCOUNT:  [de-identified]   Patient's PCP: Sasha Kincaid MD  Admit Date: 2018   Discharge Date: 2018    Length of Stay: 6  Code Status:  Full Code  Admitting Physician: Gerardo Jeong MD  Discharge Physician: Magalys Lindsay MD     Active Discharge Diagnoses:     Primary Problem  Pneumonia      [place] Problems    Diagnosis Date Noted    Pneumonia [J18.9] 2018    Abdominal pain [R10.9] 2018    Tobacco use [Z72.0] 08/10/2015       Admission Condition:  fair     Discharged Condition: fair    Hospital Stay:     Hospital Course:  Tuan Taylor is a 79 y.o. male who was admitted for the management of Pneumonia , presented to ER with No chief complaint on file. Blind left eye  Multifocal pneumonoa  Mod hiatus hernia  ? Silent aspiraitn  gerd  Lower abdo pain   Exam is negative  surg eval had egd done  No ulcer  ? Hiatus hernia with barretns  Out pt surg eval for hiatus hernia surg          Significant therapeutic interventions:     Significant Diagnostic Studies:   Labs / Micro:        ,     Radiology:    Xr Chest Standard (2 Vw)    Result Date: 2018  EXAMINATION: TWO VIEWS OF THE CHEST 2018 6:37 am COMPARISON: Chest x-ray from 2015. HISTORY: ORDERING SYSTEM PROVIDED HISTORY: Cough, SOB TECHNOLOGIST PROVIDED HISTORY: Cough, SOB FINDINGS: Lungs are hyperexpanded with flattening of the hemidiaphragms. There is no focal airspace consolidation, pleural effusion or pneumothorax. The cardiomediastinal silhouette appears within normal limits. There is similar prominence of the pulmonary vascular/interstitial markings. Visualized osseous structures appear intact, and grossly unremarkable, given the non dedicated imaging.      1. Similar prominence of the interstitial/pulmonary vascular markings can be Pulmonary arteries are adequately opacified for evaluation. Motion artifact degrades evaluation of the segmental branches in the lower lobes. No evidence of intraluminal filling defect to suggest pulmonary embolism. Main pulmonary artery is normal in caliber. Mediastinum: Mildly prominent right paraesophageal lymphadenopathy is noted in the superior mediastinum measuring up to 8 mm in short axis on image 104. Right hilar lymphatic tissue is also prominent measuring 12 mm on image 63. Mild nonspecific esophageal wall thickening is noted and appears relatively diffuse. Moderate size hiatal hernia is noted. The heart and pericardium demonstrate no acute abnormality. There is no acute abnormality of the thoracic aorta. Calcified atheromatous plaque and coronary calcifications are noted. Lungs/pleura: Respiratory motion artifact degrades fine parenchymal detail, notably in the lung bases. Moderately severe emphysema is noted. Opacities in the medial right upper lobe and medial lingula are noted as well as a peripheral opacity in the inferior right middle lobe. Findings of subsegmental atelectasis in the lung bases are noted. No effusion. No pneumothorax. The airway appears patent. Mild peribronchial wall thickening is noted. Upper Abdomen: No acute findings. Soft Tissues/Bones: No acute bone or soft tissue abnormality. Motion artifact degrades this examination, however there is no evidence for acute pulmonary embolism. Bilateral airspace disease, suggestive of an inflammatory process or infection. Mild central airway congestion is also noted. Given the background of emphysema, short-term noncontrast CT follow-up in 3 months is recommended to ensure resolution. Mildly prominent mediastinal and hilar lymph nodes. These may be reactive given the airspace disease. Attention to on follow-up is recommended. Mild relatively diffuse esophageal wall thickening. Question esophagitis.  Moderate size hiatal hernia. Emphysema. Consultations:    Consults:     Final Specialist Recommendations/Findings:   IP CONSULT TO PHARMACY  IP CONSULT TO GENERAL SURGERY      The patient was seen and examined on day of discharge and this discharge summary is in conjunction with any daily progress note from day of discharge.     Discharge plan:     Disposition: [facility]    Physician Follow Up:     Alo Hernandez MD  [address]  [phone]    Schedule an appointment as soon as possible for a visit in 1 week  follow up    Nellie Frye, [address]  [phone]    Schedule an appointment as soon as possible for a visit in 2 weeks  follow up       Requiring Further Evaluation/Follow Up POST HOSPITALIZATION/Incidental Findings:    Diet: cardiac diet    Activity: As tolerated    Instructions to Patient:     Discharge Medications:      Medication List      START taking these medications    benzonatate 200 MG capsule  Commonly known as:  TESSALON  Take 1 capsule by mouth 3 times daily as needed for Cough     esomeprazole 40 MG delayed release capsule  Commonly known as:  NEXIUM  Take 1 capsule by mouth 2 times daily     guaiFENesin 600 MG extended release tablet  Commonly known as:  MUCINEX  Take 1 tablet by mouth 2 times daily     levofloxacin 500 MG tablet  Commonly known as:  LEVAQUIN  Take 1 tablet by mouth daily for 5 days     sucralfate 1 GM tablet  Commonly known as:  CARAFATE  Take 1 tablet by mouth 4 times daily        CONTINUE taking these medications    Acetaminophen 650 MG Tabs  Take 650 mg by mouth every 4 hours as needed     albuterol sulfate  (90 Base) MCG/ACT inhaler     albuterol-ipratropium  MCG/ACT Aers inhaler  Commonly known as:  COMBIVENT RESPIMAT     docusate sodium 100 MG capsule  Commonly known as:  COLACE  Take 1 capsule by mouth 2 times daily     ferrous sulfate 325 (65 Fe) MG EC tablet  Commonly known as:  FE TABS  Take 1 tablet by mouth 2 times daily     hydrocortisone 2.5 % rectal cream  Commonly known as:  ANUSOL-HC     tamsulosin 0.4 MG capsule  Commonly known as:  FLOMAX  Take 1 capsule by mouth daily        STOP taking these medications    omeprazole 20 MG delayed release capsule  Commonly known as:  PRILOSEC           Where to Get Your Medications      [facility]  [address] [facility] [address]    Phone:  [phone]   · guaiFENesin 600 MG extended release tablet  · levofloxacin 500 MG tablet     You can get these medications from any pharmacy    Bring a paper prescription for each of these medications  · esomeprazole 40 MG delayed release capsule  · sucralfate 1 GM tablet     Information about where to get these medications is not yet available    Ask your nurse or doctor about these medications  · benzonatate 200 MG capsule         Time Spent on discharge is  35 mins in patient examination, evaluation, counseling as well as medication reconciliation, prescriptions for required medications, discharge plan and follow up. Electronically signed by   Judy Talbot MD  9/25/2018  3:35 PM      Thank you Dr. Jacob Zamorano MD for the opportunity to be involved in this patient's care.

## 2024-03-12 ENCOUNTER — OFFICE VISIT (OUTPATIENT)
Dept: PODIATRY | Age: 74
End: 2024-03-12
Payer: MEDICARE

## 2024-03-12 VITALS — BODY MASS INDEX: 26.83 KG/M2 | HEIGHT: 68 IN | WEIGHT: 177 LBS

## 2024-03-12 DIAGNOSIS — B35.1 DERMATOPHYTOSIS OF NAIL: ICD-10-CM

## 2024-03-12 DIAGNOSIS — I73.9 PVD (PERIPHERAL VASCULAR DISEASE) (HCC): ICD-10-CM

## 2024-03-12 DIAGNOSIS — R26.9 GAIT ABNORMALITY: Primary | ICD-10-CM

## 2024-03-12 DIAGNOSIS — M79.672 PAIN IN BOTH FEET: ICD-10-CM

## 2024-03-12 DIAGNOSIS — M79.671 PAIN IN BOTH FEET: ICD-10-CM

## 2024-03-12 PROCEDURE — 1123F ACP DISCUSS/DSCN MKR DOCD: CPT | Performed by: PODIATRIST

## 2024-03-12 PROCEDURE — 4004F PT TOBACCO SCREEN RCVD TLK: CPT | Performed by: PODIATRIST

## 2024-03-12 PROCEDURE — 11721 DEBRIDE NAIL 6 OR MORE: CPT | Performed by: PODIATRIST

## 2024-03-12 PROCEDURE — 3017F COLORECTAL CA SCREEN DOC REV: CPT | Performed by: PODIATRIST

## 2024-03-12 PROCEDURE — G8427 DOCREV CUR MEDS BY ELIG CLIN: HCPCS | Performed by: PODIATRIST

## 2024-03-12 PROCEDURE — 99203 OFFICE O/P NEW LOW 30 MIN: CPT | Performed by: PODIATRIST

## 2024-03-12 PROCEDURE — G8419 CALC BMI OUT NRM PARAM NOF/U: HCPCS | Performed by: PODIATRIST

## 2024-03-12 PROCEDURE — G8484 FLU IMMUNIZE NO ADMIN: HCPCS | Performed by: PODIATRIST

## 2024-03-12 RX ORDER — NYSTATIN 100000 [USP'U]/G
POWDER TOPICAL
COMMUNITY
Start: 2024-02-02

## 2024-03-12 NOTE — PROGRESS NOTES
50678 - MT DEBRIDEMENT OF NAILS, 6 OR MORE      2. Pain in both feet  03878 - MT DEBRIDEMENT OF NAILS, 6 OR MORE      3. PVD (peripheral vascular disease) (HCC)  23571 - MT DEBRIDEMENT OF NAILS, 6 OR MORE      4. Dermatophytosis of nail  51273 - MT DEBRIDEMENT OF NAILS, 6 OR MORE            Plan: Patient examined and evaluated.  Current condition and treatment options discussed in detail.  Discussed conservative and surgical options with the patient. Advised pt to his condition    Nails 1,2,3,4,5 Right and 1,2,3,4,5 Left were debrided and ground smooth with a dremmel.   The patient tolerated the procedure well without apparent complications.    Discussed topical vs oral pills for fungal treatment.  Pt would like topicals.  Verbal and written instructions given to patient. Contact office with any questions/problems/concerns.  RTC in 9week(s)  .    3/12/2024    Electronically signed by Jose Perez DPM on 3/12/2024 at 10:39 AM  3/12/2024

## (undated) DEVICE — Z DISCONTINUED USE 2424143 ADAPTER O2 SWVL CHRISTMAS TREE GRN

## (undated) DEVICE — BITEBLOCK 54FR W/ DENT RIM BLOX

## (undated) DEVICE — DEFENDO AIR WATER SUCTION AND BIOPSY VALVE KIT FOR  OLYMPUS: Brand: DEFENDO AIR/WATER/SUCTION AND BIOPSY VALVE

## (undated) DEVICE — GLOVE ORANGE PI 7 1/2   MSG9075

## (undated) DEVICE — CANNULA NSL AD TBNG L7FT PVC STR NONFLARED PRNG O2 DEL W STD

## (undated) DEVICE — Z DUPLICATE USE 2527422 TUBING O2 STD 7 FT EXTN NO CRUSH VISUAL CNTRST LF

## (undated) DEVICE — Z INACTIVE USE 2525529 CONNECTOR TBNG FOR O2

## (undated) DEVICE — FORCEPS BX L240CM WRK CHN 2.8MM STD CAP W/ NDL MIC MESH

## (undated) DEVICE — JELLY,LUBE,STERILE,FLIP TOP,TUBE,2-OZ: Brand: MEDLINE

## (undated) DEVICE — GOWN,POLY REINFORCED,LG: Brand: MEDLINE